# Patient Record
Sex: MALE | Race: WHITE | NOT HISPANIC OR LATINO | Employment: FULL TIME | URBAN - METROPOLITAN AREA
[De-identification: names, ages, dates, MRNs, and addresses within clinical notes are randomized per-mention and may not be internally consistent; named-entity substitution may affect disease eponyms.]

---

## 2017-04-27 ENCOUNTER — TELEPHONE (OUTPATIENT)
Dept: URGENT CARE | Facility: CLINIC | Age: 52
End: 2017-04-27

## 2017-04-27 DIAGNOSIS — B96.89 SINUSITIS, BACTERIAL: Primary | ICD-10-CM

## 2017-04-27 DIAGNOSIS — J32.9 SINUSITIS, BACTERIAL: Primary | ICD-10-CM

## 2017-04-27 RX ORDER — DOXYCYCLINE HYCLATE 100 MG/1
CAPSULE ORAL
Qty: 20 CAPSULE | Refills: 0 | Status: SHIPPED | OUTPATIENT
Start: 2017-04-27 | End: 2022-04-15

## 2017-04-27 NOTE — TELEPHONE ENCOUNTER
D/w pt.  Sinus sx better but never resolved now worsening; augmentin LD am 4/24.  P - doxy #20; now sched pcp.

## 2017-12-29 ENCOUNTER — LAB REQUISITION (OUTPATIENT)
Dept: LAB | Facility: OTHER | Age: 52
End: 2017-12-29

## 2017-12-29 DIAGNOSIS — Z00.00 ANNUAL PHYSICAL EXAM: ICD-10-CM

## 2017-12-29 LAB
ALBUMIN SERPL-MCNC: 3.8 G/DL (ref 3.5–5.2)
ALP SERPL-CCNC: 106 U/L (ref 39–117)
ALT SERPL W P-5'-P-CCNC: 24 U/L (ref 1–41)
AST SERPL-CCNC: 22 U/L (ref 1–40)
BASOPHILS # BLD AUTO: 0.01 10*3/MM3 (ref 0–0.2)
BASOPHILS NFR BLD AUTO: 0.1 % (ref 0–1.5)
BILIRUB CONJ SERPL-MCNC: <0.2 MG/DL (ref 0–0.3)
BILIRUB SERPL-MCNC: 0.5 MG/DL (ref 0.1–1.2)
BILIRUB UR QL STRIP: NEGATIVE
BUN BLD-MCNC: 14 MG/DL (ref 6–20)
CALCIUM SPEC-SCNC: 9 MG/DL (ref 8.6–10.5)
CHLORIDE SERPL-SCNC: 102 MMOL/L (ref 98–107)
CHOLEST SERPL-MCNC: 223 MG/DL (ref 0–200)
CHOLEST SERPL-MCNC: 224 MG/DL (ref 0–200)
CLARITY UR: CLEAR
CO2 SERPL-SCNC: 26.6 MMOL/L (ref 22–29)
COLOR UR: YELLOW
CREAT BLD-MCNC: 1.17 MG/DL (ref 0.76–1.27)
DEPRECATED RDW RBC AUTO: 47.7 FL (ref 37–54)
EOSINOPHIL # BLD AUTO: 0.1 10*3/MM3 (ref 0–0.7)
EOSINOPHIL NFR BLD AUTO: 1.4 % (ref 0.3–6.2)
ERYTHROCYTE [DISTWIDTH] IN BLOOD BY AUTOMATED COUNT: 13.3 % (ref 11.5–14.5)
GFR SERPL CREATININE-BSD FRML MDRD: 65 ML/MIN/1.73
GGT SERPL-CCNC: 23 U/L (ref 8–61)
GLUCOSE BLD-MCNC: 92 MG/DL (ref 65–99)
GLUCOSE UR STRIP-MCNC: NEGATIVE MG/DL
HCT VFR BLD AUTO: 53.6 % (ref 40.4–52.2)
HDLC SERPL-MCNC: 51 MG/DL (ref 40–60)
HDLC SERPL-MCNC: 51 MG/DL (ref 40–60)
HGB BLD-MCNC: 17.5 G/DL (ref 13.7–17.6)
HGB UR QL STRIP.AUTO: NEGATIVE
IMM GRANULOCYTES # BLD: 0 10*3/MM3 (ref 0–0.03)
IMM GRANULOCYTES NFR BLD: 0 % (ref 0–0.5)
IRON 24H UR-MRATE: 72 MCG/DL (ref 59–158)
KETONES UR QL STRIP: NEGATIVE
LDH SERPL-CCNC: 257 U/L (ref 135–225)
LDLC SERPL CALC-MCNC: 152 MG/DL (ref 0–100)
LDLC SERPL CALC-MCNC: 153 MG/DL (ref 0–100)
LDLC/HDLC SERPL: 2.98 {RATIO}
LDLC/HDLC SERPL: 3 {RATIO}
LEUKOCYTE ESTERASE UR QL STRIP.AUTO: NEGATIVE
LYMPHOCYTES # BLD AUTO: 0.74 10*3/MM3 (ref 0.9–4.8)
LYMPHOCYTES NFR BLD AUTO: 10.6 % (ref 19.6–45.3)
MCH RBC QN AUTO: 31.6 PG (ref 27–32.7)
MCHC RBC AUTO-ENTMCNC: 32.6 G/DL (ref 32.6–36.4)
MCV RBC AUTO: 96.8 FL (ref 79.8–96.2)
MONOCYTES # BLD AUTO: 0.76 10*3/MM3 (ref 0.2–1.2)
MONOCYTES NFR BLD AUTO: 10.9 % (ref 5–12)
NEUTROPHILS # BLD AUTO: 5.34 10*3/MM3 (ref 1.9–8.1)
NEUTROPHILS NFR BLD AUTO: 77 % (ref 42.7–76)
NITRITE UR QL STRIP: NEGATIVE
PH UR STRIP.AUTO: 6 [PH] (ref 5–8)
PHOSPHATE SERPL-MCNC: 3.1 MG/DL (ref 2.5–4.5)
PLATELET # BLD AUTO: 143 10*3/MM3 (ref 140–500)
PMV BLD AUTO: 11.1 FL (ref 6–12)
POTASSIUM BLD-SCNC: 4.1 MMOL/L (ref 3.5–5.2)
PROT SERPL-MCNC: 7.6 G/DL (ref 6–8.5)
PROT UR QL STRIP: NEGATIVE
PSA SERPL-MCNC: 0.64 NG/ML (ref 0–4)
RBC # BLD AUTO: 5.54 10*6/MM3 (ref 4.6–6)
SODIUM BLD-SCNC: 139 MMOL/L (ref 136–145)
SP GR UR STRIP: 1.02 (ref 1–1.03)
TRIGL SERPL-MCNC: 100 MG/DL (ref 0–150)
TRIGL SERPL-MCNC: 100 MG/DL (ref 0–150)
URATE SERPL-MCNC: 5.1 MG/DL (ref 3.4–7)
UROBILINOGEN UR QL STRIP: NORMAL
VLDLC SERPL-MCNC: 20 MG/DL (ref 5–40)
VLDLC SERPL-MCNC: 20 MG/DL (ref 5–40)
WBC NRBC COR # BLD: 6.95 10*3/MM3 (ref 4.5–10.7)

## 2017-12-29 PROCEDURE — 80053 COMPREHEN METABOLIC PANEL: CPT | Performed by: PHYSICAL MEDICINE & REHABILITATION

## 2017-12-29 PROCEDURE — 80061 LIPID PANEL: CPT | Performed by: PHYSICAL MEDICINE & REHABILITATION

## 2017-12-29 PROCEDURE — 85025 COMPLETE CBC W/AUTO DIFF WBC: CPT | Performed by: PHYSICAL MEDICINE & REHABILITATION

## 2017-12-29 PROCEDURE — 81003 URINALYSIS AUTO W/O SCOPE: CPT | Performed by: PHYSICAL MEDICINE & REHABILITATION

## 2017-12-29 PROCEDURE — 84153 ASSAY OF PSA TOTAL: CPT | Performed by: PHYSICAL MEDICINE & REHABILITATION

## 2022-04-15 ENCOUNTER — OFFICE VISIT (OUTPATIENT)
Dept: INTERNAL MEDICINE | Facility: CLINIC | Age: 57
End: 2022-04-15

## 2022-04-15 VITALS
BODY MASS INDEX: 30.6 KG/M2 | SYSTOLIC BLOOD PRESSURE: 142 MMHG | DIASTOLIC BLOOD PRESSURE: 86 MMHG | OXYGEN SATURATION: 98 % | HEART RATE: 80 BPM | WEIGHT: 206.6 LBS | HEIGHT: 69 IN | TEMPERATURE: 97.3 F

## 2022-04-15 DIAGNOSIS — M48.061 SPINAL STENOSIS OF LUMBAR REGION, UNSPECIFIED WHETHER NEUROGENIC CLAUDICATION PRESENT: ICD-10-CM

## 2022-04-15 DIAGNOSIS — Z12.5 SCREENING FOR MALIGNANT NEOPLASM OF PROSTATE: ICD-10-CM

## 2022-04-15 DIAGNOSIS — M48.02 CERVICAL STENOSIS OF SPINAL CANAL: ICD-10-CM

## 2022-04-15 DIAGNOSIS — K76.89 HEPATIC CYST: ICD-10-CM

## 2022-04-15 DIAGNOSIS — F33.0 MAJOR DEPRESSIVE DISORDER, RECURRENT EPISODE, MILD DEGREE: ICD-10-CM

## 2022-04-15 DIAGNOSIS — N28.1 RENAL CYST: ICD-10-CM

## 2022-04-15 DIAGNOSIS — Z12.12 ENCOUNTER FOR SCREENING FOR COLORECTAL MALIGNANT NEOPLASM: ICD-10-CM

## 2022-04-15 DIAGNOSIS — Z12.11 ENCOUNTER FOR SCREENING FOR COLORECTAL MALIGNANT NEOPLASM: ICD-10-CM

## 2022-04-15 DIAGNOSIS — K21.9 GASTROESOPHAGEAL REFLUX DISEASE WITHOUT ESOPHAGITIS: ICD-10-CM

## 2022-04-15 DIAGNOSIS — Z13.1 SCREENING FOR DIABETES MELLITUS: ICD-10-CM

## 2022-04-15 DIAGNOSIS — M54.12 CERVICAL RADICULOPATHY: ICD-10-CM

## 2022-04-15 DIAGNOSIS — M54.42 CHRONIC BILATERAL LOW BACK PAIN WITH BILATERAL SCIATICA: ICD-10-CM

## 2022-04-15 DIAGNOSIS — Z13.220 SCREENING, LIPID: ICD-10-CM

## 2022-04-15 DIAGNOSIS — M54.41 CHRONIC BILATERAL LOW BACK PAIN WITH BILATERAL SCIATICA: ICD-10-CM

## 2022-04-15 DIAGNOSIS — G89.29 CHRONIC BILATERAL LOW BACK PAIN WITH BILATERAL SCIATICA: ICD-10-CM

## 2022-04-15 DIAGNOSIS — R03.0 ELEVATED BLOOD PRESSURE READING WITHOUT DIAGNOSIS OF HYPERTENSION: Primary | ICD-10-CM

## 2022-04-15 DIAGNOSIS — I20.1 PRINZMETAL'S ANGINA: ICD-10-CM

## 2022-04-15 PROBLEM — R13.19 ESOPHAGEAL DYSPHAGIA: Status: ACTIVE | Noted: 2018-10-24

## 2022-04-15 PROBLEM — R13.19 ESOPHAGEAL DYSPHAGIA: Status: RESOLVED | Noted: 2018-10-24 | Resolved: 2022-04-15

## 2022-04-15 PROBLEM — M54.16 LUMBAR RADICULOPATHY: Status: ACTIVE | Noted: 2020-11-16

## 2022-04-15 PROBLEM — S43.432S LABRAL TEAR OF SHOULDER, LEFT, SEQUELA: Status: ACTIVE | Noted: 2022-04-15

## 2022-04-15 PROCEDURE — 99214 OFFICE O/P EST MOD 30 MIN: CPT | Performed by: FAMILY MEDICINE

## 2022-04-15 RX ORDER — CHOLECALCIFEROL (VITAMIN D3) 50 MCG
2000 TABLET ORAL DAILY
COMMUNITY

## 2022-04-15 RX ORDER — MULTIPLE VITAMINS W/ MINERALS TAB 9MG-400MCG
1 TAB ORAL DAILY
COMMUNITY

## 2022-04-15 RX ORDER — ASCORBIC ACID 1000 MG
TABLET, EXTENDED RELEASE ORAL
COMMUNITY
End: 2022-04-15

## 2022-04-15 RX ORDER — VIT C/B6/B5/MAGNESIUM/HERB 173 50-5-6-5MG
1000 CAPSULE ORAL
COMMUNITY
End: 2022-11-17

## 2022-04-15 RX ORDER — ASCORBIC ACID 250 MG
1000 TABLET ORAL DAILY
COMMUNITY
End: 2022-04-15

## 2022-04-15 RX ORDER — DIPHENOXYLATE HYDROCHLORIDE AND ATROPINE SULFATE 2.5; .025 MG/1; MG/1
TABLET ORAL DAILY
COMMUNITY
End: 2022-04-15

## 2022-04-15 RX ORDER — IBUPROFEN 600 MG/1
600 TABLET ORAL
COMMUNITY
End: 2022-04-15

## 2022-04-15 NOTE — PROGRESS NOTES
Date of Encounter: 04/15/2022  Patient: Manolo Stearns,  1965    Subjective   History of Presenting Illness  Chief complaint: Back pain    Chronic lower back pain, chronic neck pain, with acquired and congenital cervical and lumbar stenosis, with radicular symptoms of the left upper extremity and bilateral lower extremities.  He did have several neck injuries playing football when younger but most of his symptoms started about 2 years ago after a fall in the shower, he also felt that his back pain worsened acutely after the COVID-19 vaccination.  In the past he has had a myelogram which was inconclusive, he did consult with several neurosurgeons who did not recommend surgical intervention.  He is hesitant to take medications but occasionally uses NSAIDs and acetaminophen sparingly as needed.  The most benefit he has had was from physical therapy, right now he would like to continue that before any further recommendations.  He is interested in rheumatoid arthritis testing today.  His grandmother had rheumatoid arthritis.    MRI of his thoracic spine 2021, there is incidental renal and hepatic lesion thought to be a cyst recommended further characterization with ultrasound.    Prinzmetal's angina 2018, briefly he underwent cardiac catheterization that was normal, CT PE was negative, Doppler was negative for DVT, he did undergo an EGD 2018 showing mild erosive esophagitis that they did not think corresponded to his symptoms.  He did have an episode after this initial presentation that did improve with nitroglycerin.  No recent recurrence.    GERD, tries to avoid dietary triggers.  He has to take Tums a few times a month for this.    Major depressive disorder with history of suicidal ideation, not currently interested medications due to concern for risk of suicidal ideation.  Has seen a therapist which is helpful.    Lives with wife, 2 children.  Never a smoker.  2 alcoholic drinks per week.   Exercises regularly currently doing physical therapy.  Healthy diet.  Has never had colon cancer screening.  Works as an  with the [employer].  Does not have a living will.  Received COVID #1 and 2, declines further vaccinations due to worsening arthritis with these first 2 doses..    Review of Systems:  Negative for fever, cough, shortness of breath    The following portions of the patient's history were reviewed and updated as appropriate: allergies, current medications, past family history, past medical history, past social history, past surgical history and problem list.    Patient Active Problem List   Diagnosis   • Lumbar radiculopathy   • Prinzmetal's angina (HCC)   • Labral tear of shoulder, left, sequela   • Lumbar stenosis, congenital and acquired   • Chronic bilateral low back pain with bilateral sciatica   • Hepatic cyst   • Renal cyst   • Elevated blood pressure reading without diagnosis of hypertension   • Cervical stenosis of spinal canal   • Cervical radiculopathy   • Major depressive disorder, recurrent episode, mild degree (HCC)     History reviewed. No pertinent past medical history.  History reviewed. No pertinent surgical history.  History reviewed. No pertinent family history.    Current Outpatient Medications:   •  Cholecalciferol (Vitamin D) 50 MCG (2000 UT) tablet, Take 2,000 Units by mouth Daily., Disp: , Rfl:   •  multivitamin with minerals tablet tablet, Take 1 tablet by mouth Daily., Disp: , Rfl:   •  Turmeric 500 MG capsule, Take 1,000 mg by mouth., Disp: , Rfl:   •  Vitamin D-Vitamin K (VITAMIN K2-VITAMIN D3 PO), Take  by mouth., Disp: , Rfl:   No Known Allergies  Social History     Tobacco Use   • Smoking status: Never Smoker   • Smokeless tobacco: Never Used   Substance Use Topics   • Alcohol use: Yes     Comment: OCC          Objective   Physical Exam  Vitals:    04/15/22 1003   BP: 142/86   Pulse: 80   Temp: 97.3 °F (36.3 °C)   TempSrc: Temporal   SpO2: 98%  "  Weight: 93.7 kg (206 lb 9.6 oz)   Height: 175.3 cm (69\")     Body mass index is 30.51 kg/m².    Constitutional: NAD.  Eyes: EOMI. PERRLA. Normal conjunctiva.  Cardiovascular: RRR. No murmurs. No LE edema b/l. Radial pulses 2+ bilaterally.  Pulmonary: CTA b/l. Good effort.  Integumentary: No rashes or wounds on face or upper extremities.  Lymphatic: No anterior cervical lymphadenopathy.  Endocrine: No thyromegaly or palpable thyroid nodules.  Psychiatric: Normal affect. Normal thought content.     Assessment/Plan   Assessment and Plan  Pleasant 56-year-old male with cervical and lumbar stenosis with chronic pain and radiculopathy, GERD, Prinzmetal's angina, major depressive disorder with history of SI, who presents with the following:    Diagnoses and all orders for this visit:    1. Elevated blood pressure reading without diagnosis of hypertension (Primary): Recommend he check his blood pressure at home, usually not this elevated, he will call me if it remains elevated but we will also follow-up annual physical in 6 months.  -     CBC & Differential  -     Comprehensive Metabolic Panel  -     Thyroid Panel With TSH    2. Chronic bilateral low back pain with bilateral sciatica  -     C-reactive Protein  -     Sedimentation Rate  -     Cyclic Citrul Peptide Antibody, IgG / IgA  -     Rheumatoid Factor  -     Uric Acid  3. Spinal stenosis of lumbar region, unspecified whether neurogenic claudication present  4. Cervical stenosis of spinal canal  5. Cervical radiculopathy    We have additional options including SNRIs, gabapentin, SARAH, for now continue physical therapy.  RA testing as above.    6. Renal cyst  -     US Abdomen Complete; Future  -     Urinalysis With Microscopic - Urine, Clean Catch  7. Hepatic cyst  -     US Abdomen Complete; Future    8. Prinzmetal's angina (HCC): No recent flares, improves with nitroglycerin    9. Major depressive disorder, recurrent episode, mild degree (HCC): Stable, discussed the " potential utility of SSRIs, will continue therapy for now    10. Gastroesophageal reflux disease without esophagitis: Recommend preventative over-the-counter famotidine before triggering meals    11. Encounter for screening for colorectal malignant neoplasm  -     Ambulatory Referral For Screening Colonoscopy    12. Screening for malignant neoplasm of prostate  -     PSA Screen    13. Screening for diabetes mellitus  -     Hemoglobin A1c    14. Screening, lipid  -     Lipid Panel    Follow-up in 6 months for annual physical, blood pressure recheck    Zac Henderson MD  [facility]  O: [phone]  C: [phone]    Disclaimer: Parts of this note were dictated by speech recognition. Minor errors in transcription may be present. Please call if questions.

## 2022-04-18 DIAGNOSIS — R71.8 ELEVATED HEMATOCRIT: Primary | ICD-10-CM

## 2022-04-18 PROBLEM — E78.5 HYPERLIPIDEMIA: Status: ACTIVE | Noted: 2022-04-18

## 2022-04-18 LAB
ALBUMIN SERPL-MCNC: 4.6 G/DL (ref 3.8–4.9)
ALBUMIN/GLOB SERPL: 1.5 {RATIO} (ref 1.2–2.2)
ALP SERPL-CCNC: 107 IU/L (ref 44–121)
ALT SERPL-CCNC: 22 IU/L (ref 0–44)
APPEARANCE UR: ABNORMAL
AST SERPL-CCNC: 20 IU/L (ref 0–40)
BACTERIA #/AREA URNS HPF: NORMAL /[HPF]
BASOPHILS # BLD AUTO: 0 X10E3/UL (ref 0–0.2)
BASOPHILS NFR BLD AUTO: 1 %
BILIRUB SERPL-MCNC: 0.5 MG/DL (ref 0–1.2)
BILIRUB UR QL STRIP: NEGATIVE
BUN SERPL-MCNC: 21 MG/DL (ref 6–24)
BUN/CREAT SERPL: 20 (ref 9–20)
CALCIUM SERPL-MCNC: 10 MG/DL (ref 8.7–10.2)
CASTS URNS QL MICRO: NORMAL /LPF
CCP IGA+IGG SERPL IA-ACNC: 54 UNITS (ref 0–19)
CHLORIDE SERPL-SCNC: 101 MMOL/L (ref 96–106)
CHOLEST SERPL-MCNC: 225 MG/DL (ref 100–199)
CO2 SERPL-SCNC: 24 MMOL/L (ref 20–29)
COLOR UR: YELLOW
CREAT SERPL-MCNC: 1.07 MG/DL (ref 0.76–1.27)
CRP SERPL-MCNC: <1 MG/L (ref 0–10)
EGFRCR SERPLBLD CKD-EPI 2021: 81 ML/MIN/1.73
EOSINOPHIL # BLD AUTO: 0.1 X10E3/UL (ref 0–0.4)
EOSINOPHIL NFR BLD AUTO: 2 %
EPI CELLS #/AREA URNS HPF: NORMAL /HPF (ref 0–10)
ERYTHROCYTE [DISTWIDTH] IN BLOOD BY AUTOMATED COUNT: 12.4 % (ref 11.6–15.4)
ERYTHROCYTE [SEDIMENTATION RATE] IN BLOOD BY WESTERGREN METHOD: 3 MM/HR (ref 0–30)
FT4I SERPL CALC-MCNC: 3 (ref 1.2–4.9)
GLOBULIN SER CALC-MCNC: 3 G/DL (ref 1.5–4.5)
GLUCOSE SERPL-MCNC: 92 MG/DL (ref 65–99)
GLUCOSE UR QL STRIP: NEGATIVE
HBA1C MFR BLD: 5.4 % (ref 4.8–5.6)
HCT VFR BLD AUTO: 53.8 % (ref 37.5–51)
HDLC SERPL-MCNC: 48 MG/DL
HGB BLD-MCNC: 18.3 G/DL (ref 13–17.7)
HGB UR QL STRIP: NEGATIVE
IMM GRANULOCYTES # BLD AUTO: 0 X10E3/UL (ref 0–0.1)
IMM GRANULOCYTES NFR BLD AUTO: 0 %
KETONES UR QL STRIP: NEGATIVE
LDLC SERPL CALC-MCNC: 152 MG/DL (ref 0–99)
LEUKOCYTE ESTERASE UR QL STRIP: NEGATIVE
LYMPHOCYTES # BLD AUTO: 1.5 X10E3/UL (ref 0.7–3.1)
LYMPHOCYTES NFR BLD AUTO: 18 %
MCH RBC QN AUTO: 31.2 PG (ref 26.6–33)
MCHC RBC AUTO-ENTMCNC: 34 G/DL (ref 31.5–35.7)
MCV RBC AUTO: 92 FL (ref 79–97)
MICRO URNS: ABNORMAL
MONOCYTES # BLD AUTO: 0.8 X10E3/UL (ref 0.1–0.9)
MONOCYTES NFR BLD AUTO: 9 %
MUCOUS THREADS URNS QL MICRO: PRESENT
NEUTROPHILS # BLD AUTO: 5.7 X10E3/UL (ref 1.4–7)
NEUTROPHILS NFR BLD AUTO: 70 %
NITRITE UR QL STRIP: NEGATIVE
PH UR STRIP: 7 [PH] (ref 5–7.5)
PLATELET # BLD AUTO: 183 X10E3/UL (ref 150–450)
POTASSIUM SERPL-SCNC: 4.6 MMOL/L (ref 3.5–5.2)
PROT SERPL-MCNC: 7.6 G/DL (ref 6–8.5)
PROT UR QL STRIP: ABNORMAL
PSA SERPL-MCNC: 0.5 NG/ML (ref 0–4)
RBC # BLD AUTO: 5.87 X10E6/UL (ref 4.14–5.8)
RBC #/AREA URNS HPF: NORMAL /HPF (ref 0–2)
RHEUMATOID FACT SERPL-ACNC: <10 IU/ML
SODIUM SERPL-SCNC: 142 MMOL/L (ref 134–144)
SP GR UR STRIP: 1.02 (ref 1–1.03)
T3RU NFR SERPL: 29 % (ref 24–39)
T4 SERPL-MCNC: 10.3 UG/DL (ref 4.5–12)
TRIGL SERPL-MCNC: 136 MG/DL (ref 0–149)
TSH SERPL DL<=0.005 MIU/L-ACNC: 1.95 UIU/ML (ref 0.45–4.5)
URATE SERPL-MCNC: 5.4 MG/DL (ref 3.8–8.4)
UROBILINOGEN UR STRIP-MCNC: 0.2 MG/DL (ref 0.2–1)
VLDLC SERPL CALC-MCNC: 25 MG/DL (ref 5–40)
WBC # BLD AUTO: 8.1 X10E3/UL (ref 3.4–10.8)
WBC #/AREA URNS HPF: NORMAL /HPF (ref 0–5)

## 2022-04-28 DIAGNOSIS — M54.42 CHRONIC BILATERAL LOW BACK PAIN WITH BILATERAL SCIATICA: Primary | ICD-10-CM

## 2022-04-28 DIAGNOSIS — M54.41 CHRONIC BILATERAL LOW BACK PAIN WITH BILATERAL SCIATICA: Primary | ICD-10-CM

## 2022-04-28 DIAGNOSIS — M48.061 SPINAL STENOSIS OF LUMBAR REGION, UNSPECIFIED WHETHER NEUROGENIC CLAUDICATION PRESENT: ICD-10-CM

## 2022-04-28 DIAGNOSIS — G89.29 CHRONIC BILATERAL LOW BACK PAIN WITH BILATERAL SCIATICA: Primary | ICD-10-CM

## 2022-04-28 DIAGNOSIS — R76.8 CYCLIC CITRULLINATED PEPTIDE (CCP) ANTIBODY POSITIVE: ICD-10-CM

## 2022-04-28 DIAGNOSIS — M48.02 CERVICAL STENOSIS OF SPINAL CANAL: ICD-10-CM

## 2022-05-05 ENCOUNTER — HOSPITAL ENCOUNTER (OUTPATIENT)
Dept: ULTRASOUND IMAGING | Facility: HOSPITAL | Age: 57
Discharge: HOME | End: 2022-05-05
Admitting: FAMILY MEDICINE

## 2022-05-05 DIAGNOSIS — N28.1 RENAL CYST: ICD-10-CM

## 2022-05-05 DIAGNOSIS — K76.89 HEPATIC CYST: ICD-10-CM

## 2022-05-05 PROCEDURE — 76700 US EXAM ABDOM COMPLETE: CPT

## 2022-05-06 NOTE — PROGRESS NOTES
You do have a few small gallstones and 2 benign liver cyst and 1 benign kidney cyst.  I do not think these are of any significance and I do not recommend any further follow-up at this time.

## 2022-05-11 ENCOUNTER — OFFICE VISIT (OUTPATIENT)
Dept: INTERNAL MEDICINE | Facility: CLINIC | Age: 57
End: 2022-05-11

## 2022-05-11 VITALS
TEMPERATURE: 98.2 F | HEIGHT: 69 IN | OXYGEN SATURATION: 95 % | SYSTOLIC BLOOD PRESSURE: 126 MMHG | DIASTOLIC BLOOD PRESSURE: 74 MMHG | BODY MASS INDEX: 30.51 KG/M2 | WEIGHT: 206 LBS | HEART RATE: 83 BPM

## 2022-05-11 DIAGNOSIS — R09.81 NASAL CONGESTION: Primary | ICD-10-CM

## 2022-05-11 DIAGNOSIS — J02.9 SORE THROAT: ICD-10-CM

## 2022-05-11 DIAGNOSIS — R50.9 FEVER, UNSPECIFIED FEVER CAUSE: ICD-10-CM

## 2022-05-11 DIAGNOSIS — R05.9 COUGH: ICD-10-CM

## 2022-05-11 LAB
EXPIRATION DATE: NORMAL
EXPIRATION DATE: NORMAL
FLUAV AG UPPER RESP QL IA.RAPID: NOT DETECTED
FLUBV AG UPPER RESP QL IA.RAPID: NOT DETECTED
INTERNAL CONTROL: NORMAL
INTERNAL CONTROL: NORMAL
Lab: NORMAL
Lab: NORMAL
S PYO AG THROAT QL: NEGATIVE
SARS-COV-2 AG UPPER RESP QL IA.RAPID: NOT DETECTED

## 2022-05-11 PROCEDURE — 87428 SARSCOV & INF VIR A&B AG IA: CPT | Performed by: NURSE PRACTITIONER

## 2022-05-11 PROCEDURE — 99214 OFFICE O/P EST MOD 30 MIN: CPT | Performed by: NURSE PRACTITIONER

## 2022-05-11 PROCEDURE — 87880 STREP A ASSAY W/OPTIC: CPT | Performed by: NURSE PRACTITIONER

## 2022-05-11 RX ORDER — AZITHROMYCIN 250 MG/1
TABLET, FILM COATED ORAL
Qty: 6 TABLET | Refills: 0 | Status: SHIPPED | OUTPATIENT
Start: 2022-05-11 | End: 2022-10-17

## 2022-05-11 RX ORDER — BENZONATATE 100 MG/1
100 CAPSULE ORAL 3 TIMES DAILY PRN
Qty: 30 CAPSULE | Refills: 0 | Status: SHIPPED | OUTPATIENT
Start: 2022-05-11 | End: 2022-10-17

## 2022-05-11 NOTE — PROGRESS NOTES
Date of Encounter: 2022  Patient: Manolo Stearns,  1965    Subjective     Chief complaint: Nasal congestion, cough, fever, sore throat    HPI   Patient here today for sick visit.  Patient states that he has been feeling bad for over a week.  Patient has lots of drainage as well as tenderness across his forehead and under his left eye.  Patient states he took ibuprofen for a fever this morning.  Has also been taking some allergy medication over-the-counter from [employer].   Patient states he has a dry cough that is deep in his chest.  Patient denies being around anybody that he knows to be sick.  Patient states he is just felt worse over the last week.     Patient states he does not typically like to take medications but feels like he is getting worse and not better.    Review of Systems:  Negative forchest pain upon exertion, shortness of breath, vision changes, vomiting, dysuria, lymphadenopathy, muscle weakness, numbness, mood changes, rashes.  Positive for fever, cough, congestion.    The following portions of the patient's history were reviewed and updated as appropriate: allergies, current medications, past family history, past medical history, past social history, past surgical history and problem list.    Patient Active Problem List   Diagnosis   • Lumbar radiculopathy   • Prinzmetal's angina (HCC)   • Labral tear of shoulder, left, sequela   • Lumbar stenosis, congenital and acquired   • Chronic bilateral low back pain with bilateral sciatica   • Hepatic cyst   • Renal cyst   • Elevated blood pressure reading without diagnosis of hypertension   • Cervical stenosis of spinal canal   • Cervical radiculopathy   • Major depressive disorder, recurrent episode, mild degree (HCC)   • Hyperlipidemia   • Elevated hematocrit     No past medical history on file.  No past surgical history on file.  No family history on file.    Current Outpatient Medications:   •  Cholecalciferol (Vitamin D) 50 MCG (  "UT) tablet, Take 2,000 Units by mouth Daily., Disp: , Rfl:   •  multivitamin with minerals tablet tablet, Take 1 tablet by mouth Daily., Disp: , Rfl:   •  Turmeric 500 MG capsule, Take 1,000 mg by mouth., Disp: , Rfl:   •  Vitamin D-Vitamin K (VITAMIN K2-VITAMIN D3 PO), Take  by mouth., Disp: , Rfl:   •  azithromycin (Zithromax Z-Ashok) 250 MG tablet, Take 2 tablets by mouth on day 1, then 1 tablet daily on days 2-5, Disp: 6 tablet, Rfl: 0  •  benzonatate (Tessalon Perles) 100 MG capsule, Take 1 capsule by mouth 3 (Three) Times a Day As Needed for Cough., Disp: 30 capsule, Rfl: 0  No Known Allergies  Social History     Tobacco Use   • Smoking status: Never Smoker   • Smokeless tobacco: Never Used   Substance Use Topics   • Alcohol use: Yes     Comment: OCC          Objective   Physical Exam   Vitals:    05/11/22 0906   BP: 126/74   Pulse: 83   Temp: 98.2 °F (36.8 °C)   TempSrc: Temporal   SpO2: 95%   Weight: 93.4 kg (206 lb)   Height: 175.3 cm (69\")     Body mass index is 30.42 kg/m².    Constitutional: NAD.  Eyes: EOMI. PERRLA. Normal conjunctiva.  Ear, nose, mouth, throat: No tonsillar exudates positive for erythema.   Normal nasal mucosa. Normal external ear canals and right TM red.  Tenderness with palpation under left eye.  Cardiovascular: RRR. No murmurs. No LE edema b/l. Radial pulses 2+ bilaterally.  Pulmonary: CTA b/l. Good effort.           Assessment & Plan   Assessment and Plan  Pleasant 56-year-old male with elevated hematocrit, major depressive order, lumbar radiculopathy and others here today for sick visit.  The following was discussed:    Diagnoses and all orders for this visit:    1. Nasal congestion (Primary)  -     POCT SARS-CoV-2 Antigen KALYN  -     azithromycin (Zithromax Z-Ashok) 250 MG tablet; Take 2 tablets by mouth on day 1, then 1 tablet daily on days 2-5  Dispense: 6 tablet; Refill: 0    2. Cough  -     POCT SARS-CoV-2 Antigen KALYN  -     azithromycin (Zithromax Z-Ashok) 250 MG tablet; Take 2 " tablets by mouth on day 1, then 1 tablet daily on days 2-5  Dispense: 6 tablet; Refill: 0  -     benzonatate (Tessalon Perles) 100 MG capsule; Take 1 capsule by mouth 3 (Three) Times a Day As Needed for Cough.  Dispense: 30 capsule; Refill: 0    3. Fever, unspecified fever cause  -     POCT SARS-CoV-2 Antigen KALYN    4. Sore throat  -     POCT rapid strep A         Discussed about medications with patient.  Encourage patient to start daily allergy medication.  Discussed about staying well-hydrated and getting rest.  Answered some questions from the patient.  Patient verbalized understanding of and agreed to plan of care.  Return if symptoms worsen or fail to improve.     Asya Teixeira DNP, FNP-C  [facility]  O: [phone]      Please note that portions of this note were completed with a voice recognition program. Please call if questions.

## 2022-07-29 ENCOUNTER — HOSPITAL ENCOUNTER (OUTPATIENT)
Dept: GENERAL RADIOLOGY | Facility: HOSPITAL | Age: 57
Discharge: HOME | End: 2022-07-29
Admitting: FAMILY MEDICINE

## 2022-07-29 ENCOUNTER — OFFICE VISIT (OUTPATIENT)
Dept: INTERNAL MEDICINE | Facility: CLINIC | Age: 57
End: 2022-07-29

## 2022-07-29 VITALS
TEMPERATURE: 97.5 F | BODY MASS INDEX: 30.72 KG/M2 | SYSTOLIC BLOOD PRESSURE: 134 MMHG | HEIGHT: 69 IN | WEIGHT: 207.4 LBS | HEART RATE: 71 BPM | DIASTOLIC BLOOD PRESSURE: 94 MMHG | OXYGEN SATURATION: 98 %

## 2022-07-29 DIAGNOSIS — F33.0 MAJOR DEPRESSIVE DISORDER, RECURRENT EPISODE, MILD DEGREE: ICD-10-CM

## 2022-07-29 DIAGNOSIS — I20.1 PRINZMETAL'S ANGINA: ICD-10-CM

## 2022-07-29 DIAGNOSIS — R07.89 CHEST DISCOMFORT: ICD-10-CM

## 2022-07-29 DIAGNOSIS — R06.02 SHORTNESS OF BREATH: Primary | ICD-10-CM

## 2022-07-29 DIAGNOSIS — G89.29 CHRONIC BILATERAL LOW BACK PAIN WITH BILATERAL SCIATICA: ICD-10-CM

## 2022-07-29 DIAGNOSIS — M54.42 CHRONIC BILATERAL LOW BACK PAIN WITH BILATERAL SCIATICA: ICD-10-CM

## 2022-07-29 DIAGNOSIS — M54.41 CHRONIC BILATERAL LOW BACK PAIN WITH BILATERAL SCIATICA: ICD-10-CM

## 2022-07-29 DIAGNOSIS — I10 BENIGN ESSENTIAL HYPERTENSION: ICD-10-CM

## 2022-07-29 DIAGNOSIS — R76.8 CYCLIC CITRULLINATED PEPTIDE (CCP) ANTIBODY POSITIVE: ICD-10-CM

## 2022-07-29 PROBLEM — M16.0 PRIMARY OSTEOARTHRITIS OF BOTH HIPS: Status: ACTIVE | Noted: 2022-05-17

## 2022-07-29 PROCEDURE — 93000 ELECTROCARDIOGRAM COMPLETE: CPT | Performed by: FAMILY MEDICINE

## 2022-07-29 PROCEDURE — 99214 OFFICE O/P EST MOD 30 MIN: CPT | Performed by: FAMILY MEDICINE

## 2022-07-29 PROCEDURE — 71046 X-RAY EXAM CHEST 2 VIEWS: CPT

## 2022-07-29 NOTE — PROGRESS NOTES
Date of Encounter: 2022  Patient: Manolo Stearns,  1965    Subjective   History of Presenting Illness  Chief complaint: Shortness of breath    For most 2 weeks patient has been having exertional shortness of breath, intermittent chest pressure that is nonradiating.  Exertion that causes symptoms includes going up stairs or walking short distances around the house.  He feels this is different than his previous Prinzmetal's angina.  He has not tried his nitroglycerin for this.  He denies cough, wheezing, fever, and chills, leg swelling.    Hypertension, blood pressures at home with systolics 130s-140s/80s-90s.  At this time he is not interested in taking blood pressure medications as with his recent improvement in his back pain he feels he can make lifestyle changes needed to lose weight.    A lot of external stressors right now. He has a history of suicidal thoughts.  He is very concerned about trying antidepressants or SNRIs due to increased risk of suicidal ideation.    Positive CCP, follow-up testing ended up being negative for autoimmune disorders.    Status post corticosteroid injections of the SI joints and hips, actually having some improvement in the pain of his back, optimistic in his ability to start ambulating.    Review of Systems:  Negative for fever, wheezing, cough    The following portions of the patient's history were reviewed and updated as appropriate: allergies, current medications, past family history, past medical history, past social history, past surgical history and problem list.    Patient Active Problem List   Diagnosis   • Lumbar radiculopathy   • Prinzmetal's angina (HCC)   • Labral tear of shoulder, left, sequela   • Lumbar stenosis, congenital and acquired   • Chronic bilateral low back pain with bilateral sciatica   • Hepatic cyst   • Renal cyst   • Benign essential hypertension   • Cervical stenosis of spinal canal   • Cervical radiculopathy   • Major depressive  "disorder, recurrent episode, mild degree (HCC)   • Hyperlipidemia   • Elevated hematocrit   • Primary osteoarthritis of both hips   • Cyclic citrullinated peptide (CCP) antibody positive, follow up testing negative     No past medical history on file.  No past surgical history on file.  No family history on file.    Current Outpatient Medications:   •  Cholecalciferol (Vitamin D) 50 MCG (2000 UT) tablet, Take 2,000 Units by mouth Daily., Disp: , Rfl:   •  multivitamin with minerals tablet tablet, Take 1 tablet by mouth Daily., Disp: , Rfl:   •  Naproxen Sodium (ALEVE PO), Take  by mouth., Disp: , Rfl:   •  azithromycin (Zithromax Z-Ashok) 250 MG tablet, Take 2 tablets by mouth on day 1, then 1 tablet daily on days 2-5, Disp: 6 tablet, Rfl: 0  •  benzonatate (Tessalon Perles) 100 MG capsule, Take 1 capsule by mouth 3 (Three) Times a Day As Needed for Cough., Disp: 30 capsule, Rfl: 0  •  Turmeric 500 MG capsule, Take 1,000 mg by mouth., Disp: , Rfl:   •  Vitamin D-Vitamin K (VITAMIN K2-VITAMIN D3 PO), Take  by mouth., Disp: , Rfl:   No Known Allergies  Social History     Tobacco Use   • Smoking status: Never Smoker   • Smokeless tobacco: Never Used   Substance Use Topics   • Alcohol use: Yes     Comment: OCC          Objective   Physical Exam  Vitals:    07/29/22 1347   BP: 134/94   BP Location: Left arm   Patient Position: Sitting   Cuff Size: Adult   Pulse: 71   Temp: 97.5 °F (36.4 °C)   TempSrc: Infrared   SpO2: 98%   Weight: 94.1 kg (207 lb 6.4 oz)   Height: 175.3 cm (69\")     Body mass index is 30.63 kg/m².    Constitutional: NAD.  Cardiovascular: RRR. No murmurs. No LE edema b/l. Radial pulses 2+ bilaterally.  Pulmonary: CTA b/l. Good effort.  Integumentary: No rashes or wounds on face or upper extremities.  Lymphatic: No anterior cervical lymphadenopathy.  Endocrine: No thyromegaly or palpable thyroid nodules.  Psychiatric: Mildly blunted, mildly irritable affect.  Anxieties concerning risk of medications. Good " eye contact. Normal speech pattern. Fair insight and judgment.       Assessment & Plan   Assessment and Plan  Pleasant 56-year-old male with cervical and lumbar stenosis with chronic pain and radiculopathy, GERD, Prinzmetal's angina, major depressive disorder with history of SI, who presents with the following:    Diagnoses and all orders for this visit:    1. Shortness of breath (Primary)  -     XR Chest 2 View  -     CBC & Differential  -     Peripheral Blood Smear  -     ECG 12 Lead  2. Chest discomfort  3. Prinzmetal's angina (HCC)  -     ECG 12 Lead      ECG 12 Lead    Date/Time: 7/29/2022 9:46 PM  Performed by: Zac Henderson MD  Authorized by: Zac Henderson MD   Comparison: compared with previous ECG from 9/13/2018  Similar to previous ECG  Rhythm: sinus rhythm  Rate: normal  Rate comments: Mild a  Conduction: conduction normal  ST Segments: ST segments normal  T Waves: T waves normal  QRS axis: left  Other: no other findings    Clinical impression: normal ECG        In context of his chest discomfort, shortness of breath, stress similar to previous episode, this may be Prinzmetal's angina.  He has not yet tried nitroglycerin.  We will obtain chest x-ray and follow-up labs and if unremarkable may recommend trial of nitroglycerin, he has been offered Imdur in the past.       4. Benign essential hypertension: We discussed the etiology, prognosis, and management of hypertension.  At this time he is not interested in blood pressure medications as he feels he will be able to make lifestyle changes to improve his blood pressure.    5. Chronic bilateral low back pain with bilateral sciatica: He is having initial improvement in his osteoarthritis symptoms after the corticosteroid injections, hopeful for continued benefit.  We did discuss SNRI but he is concerned due to risk of suicidal ideation (black box warning) for this medication class in context of his history.    6. Cyclic citrullinated peptide (CCP) antibody  positive, follow up testing negative: Rheumatoid arthritis evaluation was not positive for autoimmune dysfunction or disease    7. Major depressive disorder, recurrent episode, mild degree (HCC):: He is not interested in medications that now, he will continue therapy.  May need to revisit this in the near future.    Zac Henderson MD  [facility]  O: [phone]  C: [phone]    Disclaimer: Parts of this note were dictated by speech recognition. Minor errors in transcription may be present. Please call if questions.

## 2022-07-30 NOTE — PROGRESS NOTES
Left voicemail on patient's phone  Chest x-ray overall normal  Based upon his symptoms I do wonder whether he is having recurrent Prinzmetal angina  I like him to try nitroglycerin, if improving can consider Imdur  If not improving we need to consider further evaluation  Labs obviously are still pending and may change plan

## 2022-08-02 LAB
BASOPHILS # BLD AUTO: 0 X10E3/UL (ref 0–0.2)
BASOPHILS NFR BLD AUTO: 0 %
EOSINOPHIL # BLD AUTO: 0.1 X10E3/UL (ref 0–0.4)
EOSINOPHIL NFR BLD AUTO: 2 %
ERYTHROCYTE [DISTWIDTH] IN BLOOD BY AUTOMATED COUNT: 13.3 % (ref 11.6–15.4)
HCT VFR BLD AUTO: 50.5 % (ref 37.5–51)
HGB BLD-MCNC: 16.9 G/DL (ref 13–17.7)
IMM GRANULOCYTES # BLD AUTO: 0 X10E3/UL (ref 0–0.1)
IMM GRANULOCYTES NFR BLD AUTO: 0 %
LYMPHOCYTES # BLD AUTO: 1.9 X10E3/UL (ref 0.7–3.1)
LYMPHOCYTES NFR BLD AUTO: 30 %
MCH RBC QN AUTO: 31.2 PG (ref 26.6–33)
MCHC RBC AUTO-ENTMCNC: 33.5 G/DL (ref 31.5–35.7)
MCV RBC AUTO: 93 FL (ref 79–97)
MONOCYTES # BLD AUTO: 0.7 X10E3/UL (ref 0.1–0.9)
MONOCYTES NFR BLD AUTO: 10 %
NEUTROPHILS # BLD AUTO: 3.7 X10E3/UL (ref 1.4–7)
NEUTROPHILS NFR BLD AUTO: 58 %
PATH INTERP BLD-IMP: NORMAL
PATH REV BLD -IMP: NORMAL
PATHOLOGIST NAME: NORMAL
PLATELET # BLD AUTO: 174 X10E3/UL (ref 150–450)
RBC # BLD AUTO: 5.41 X10E6/UL (ref 4.14–5.8)
WBC # BLD AUTO: 6.4 X10E3/UL (ref 3.4–10.8)

## 2022-10-17 ENCOUNTER — OFFICE VISIT (OUTPATIENT)
Dept: INTERNAL MEDICINE | Facility: CLINIC | Age: 57
End: 2022-10-17

## 2022-10-17 VITALS
HEIGHT: 69 IN | BODY MASS INDEX: 32.23 KG/M2 | HEART RATE: 88 BPM | DIASTOLIC BLOOD PRESSURE: 82 MMHG | OXYGEN SATURATION: 96 % | WEIGHT: 217.6 LBS | TEMPERATURE: 97.2 F | SYSTOLIC BLOOD PRESSURE: 139 MMHG

## 2022-10-17 DIAGNOSIS — R06.02 SHORTNESS OF BREATH: ICD-10-CM

## 2022-10-17 DIAGNOSIS — H93.13 BILATERAL TINNITUS: ICD-10-CM

## 2022-10-17 DIAGNOSIS — I20.1 PRINZMETAL'S ANGINA: ICD-10-CM

## 2022-10-17 DIAGNOSIS — H91.93 DECREASED HEARING OF BOTH EARS: ICD-10-CM

## 2022-10-17 DIAGNOSIS — I10 BENIGN ESSENTIAL HYPERTENSION: ICD-10-CM

## 2022-10-17 DIAGNOSIS — B96.89 BACTERIAL SINUSITIS: Primary | ICD-10-CM

## 2022-10-17 DIAGNOSIS — J32.9 BACTERIAL SINUSITIS: Primary | ICD-10-CM

## 2022-10-17 DIAGNOSIS — R41.89 SUBJECTIVE MEMORY COMPLAINTS: ICD-10-CM

## 2022-10-17 DIAGNOSIS — R07.89 CHEST DISCOMFORT: ICD-10-CM

## 2022-10-17 PROCEDURE — 99214 OFFICE O/P EST MOD 30 MIN: CPT | Performed by: FAMILY MEDICINE

## 2022-10-17 RX ORDER — ALBUTEROL SULFATE 90 UG/1
2 AEROSOL, METERED RESPIRATORY (INHALATION) EVERY 4 HOURS PRN
Qty: 6.7 G | Refills: 3 | Status: SHIPPED | OUTPATIENT
Start: 2022-10-17

## 2022-10-17 RX ORDER — AMOXICILLIN AND CLAVULANATE POTASSIUM 875; 125 MG/1; MG/1
1 TABLET, FILM COATED ORAL 2 TIMES DAILY
Qty: 20 TABLET | Refills: 0 | Status: SHIPPED | OUTPATIENT
Start: 2022-10-17 | End: 2022-10-27

## 2022-10-17 RX ORDER — ASCORBIC ACID 500 MG
TABLET ORAL
COMMUNITY

## 2022-10-17 RX ORDER — GUAIFENESIN AND DEXTROMETHORPHAN HYDROBROMIDE 600; 30 MG/1; MG/1
TABLET, EXTENDED RELEASE ORAL 2 TIMES DAILY PRN
COMMUNITY

## 2022-10-17 NOTE — PROGRESS NOTES
Date of Encounter: 10/17/2022  Patient: Manolo Stearns,  1965    Subjective   History of Presenting Illness  Chief complaint: Sinusitis    2-week history of worsening nasal congestion with facial pain, purulent nasal discharge, headache and fatigue.  He is using over-the-counter antihistamines for symptomatic relief.  He feels that his symptoms are worsening and this is similar to prior sinus infections.    Nonradiating chest discomfort with shortness of breath for about 4 months.  Per previous note we recommended a trial of nitroglycerin for symptomatic relief but this did not improve his symptoms and only resulted in a headache.  Symptoms most noticeable after climbing a flight of stairs.  He does not notice any wheezing, cough, or fever.  Chest x-ray 2022 overall unremarkable.  He reports a cardiac stress test through his work at some sort of pulmonary function test done several years ago that was reportedly normal.  He does not feel that his symptoms are worsening but they are also not improving.  He does not currently see a cardiologist.    Bilateral tinnitus with hearing loss.  This is severe enough that he would wear hearing aids if indicated.  He has not had formal hearing testing.    Reports some subjective word finding difficulties.  He does not get lost while driving.  He has not lapsed in any of his responsibilities.  He manages his finances without difficulty.  He reports his mood is doing better from the last time we talked.    Review of Systems:  Per HPI    The following portions of the patient's history were reviewed and updated as appropriate: allergies, current medications, past family history, past medical history, past social history, past surgical history and problem list.    Patient Active Problem List   Diagnosis   • Lumbar radiculopathy   • Prinzmetal's angina (HCC)   • Labral tear of shoulder, left, sequela   • Lumbar stenosis, congenital and acquired   • Chronic bilateral  "low back pain with bilateral sciatica   • Hepatic cyst   • Renal cyst   • Benign essential hypertension   • Cervical stenosis of spinal canal   • Cervical radiculopathy   • Major depressive disorder, recurrent episode, mild degree (HCC)   • Hyperlipidemia   • Elevated hematocrit   • Primary osteoarthritis of both hips   • Cyclic citrullinated peptide (CCP) antibody positive, follow up testing negative   • Subjective memory complaints     History reviewed. No pertinent past medical history.  History reviewed. No pertinent surgical history.  History reviewed. No pertinent family history.    Current Outpatient Medications:   •  Cholecalciferol (Vitamin D) 50 MCG (2000 UT) tablet, Take 2,000 Units by mouth Daily., Disp: , Rfl:   •  guaifenesin-dextromethorphan (MUCINEX DM)  MG tablet sustained-release 12 hour tablet, Take  by mouth 2 (Two) Times a Day As Needed., Disp: , Rfl:   •  multivitamin with minerals tablet tablet, Take 1 tablet by mouth Daily., Disp: , Rfl:   •  Naproxen Sodium (ALEVE PO), Take  by mouth., Disp: , Rfl:   •  Vitamin D-Vitamin K (VITAMIN K2-VITAMIN D3 PO), Take  by mouth., Disp: , Rfl:   •  albuterol sulfate  (90 Base) MCG/ACT inhaler, Inhale 2 puffs Every 4 (Four) Hours As Needed for Shortness of Air., Disp: 6.7 g, Rfl: 3  •  amoxicillin-clavulanate (Augmentin) 875-125 MG per tablet, Take 1 tablet by mouth 2 (Two) Times a Day for 10 days., Disp: 20 tablet, Rfl: 0  •  ascorbic acid (VITAMIN C) 500 MG tablet, , Disp: , Rfl:   •  Turmeric 500 MG capsule, Take 1,000 mg by mouth., Disp: , Rfl:   No Known Allergies  Social History     Tobacco Use   • Smoking status: Never   • Smokeless tobacco: Never   Substance Use Topics   • Alcohol use: Yes     Comment: OCC          Objective   Physical Exam  Vitals:    10/17/22 1517   BP: 139/82   Pulse: 88   Temp: 97.2 °F (36.2 °C)   TempSrc: Temporal   SpO2: 96%   Weight: 98.7 kg (217 lb 9.6 oz)   Height: 175.3 cm (69\")     Body mass index is 32.13 " kg/m².    Constitutional: NAD.  Eyes: EOMI. PERRLA. Normal conjunctiva.  Ear, nose, mouth, throat: Postnasal drip.  No tonsillar exudates or erythema.   Boggy nasal mucosa with thick yellow rhinorrhea.  There is bilateral maxillary sinus tenderness to palpation and right frontal sinus tenderness to palpation.  Normal external ear canals and TMs bilaterally.  Cardiovascular: RRR. No murmurs. No LE edema b/l. Radial pulses 2+ bilaterally.  Pulmonary: CTA b/l. Good effort.  Integumentary: No rashes or wounds on face or upper extremities.  Lymphatic: Jugulodigastric nodes are tender to palpation.  Endocrine: No thyromegaly or palpable thyroid nodules.  Psychiatric: Normal affect. Normal thought content.     Assessment & Plan   Assessment and Plan  Pleasant 56-year-old male with cervical and lumbar stenosis with chronic pain and radiculopathy, GERD, Prinzmetal's angina, major depressive disorder with history of SI, who presents with the following:       Diagnoses and all orders for this visit:    1. Bacterial sinusitis (Primary)  -     amoxicillin-clavulanate (Augmentin) 875-125 MG per tablet; Take 1 tablet by mouth 2 (Two) Times a Day for 10 days.  Dispense: 20 tablet; Refill: 0    2. Prinzmetal's angina (HCC)  -     Ambulatory Referral to Cardiology  3. Chest discomfort  -     Ambulatory Referral to Cardiology  4. Shortness of breath  -     albuterol sulfate  (90 Base) MCG/ACT inhaler; Inhale 2 puffs Every 4 (Four) Hours As Needed for Shortness of Air.  Dispense: 6.7 g; Refill: 3  -     Ambulatory Referral to Cardiology  Exertional chest discomfort with shortness of breath.  EKG at last appointment was overall unremarkable.  EGD only showed mild esophagitis.  Chest x-ray normal.  Peak flow meter is normal range today.  Recent repeat CBC was normal.  Discomfort did not improve with nitroglycerin.  He reports cardiac stress testing and some sort of lung function test through his work but records are not  available.    Based on his history of Prinzmetal's angina would like a consultation with cardiology for further evaluation.  Meanwhile, we will try him with an empiric albuterol inhaler in case this is related to atelectasis noted on chest x-ray 7/2022.  I discussed reasons to return for further evaluation.    5. Benign essential hypertension: Continue to monitor borderline hypertension    6. Bilateral tinnitus:  -     Ambulatory Referral to Audiology  7. Decreased hearing of both ears  -     Ambulatory Referral to Audiology    8. Subjective memory complaints: Interview does not yield any obvious memory deficits.  We will do [place] at next appointment if still having problems.    Zac Henderson MD  Family Medicine  O: [phone]  C: [phone]    Disclaimer: Parts of this note were dictated by speech recognition. Minor errors in transcription may be present. Please call if questions.

## 2022-11-17 ENCOUNTER — OFFICE VISIT (OUTPATIENT)
Dept: CARDIOLOGY | Facility: CLINIC | Age: 57
End: 2022-11-17

## 2022-11-17 VITALS
HEART RATE: 86 BPM | BODY MASS INDEX: 31.84 KG/M2 | HEIGHT: 69 IN | SYSTOLIC BLOOD PRESSURE: 130 MMHG | DIASTOLIC BLOOD PRESSURE: 90 MMHG | WEIGHT: 215 LBS

## 2022-11-17 DIAGNOSIS — I10 BENIGN ESSENTIAL HYPERTENSION: ICD-10-CM

## 2022-11-17 DIAGNOSIS — R06.02 SOB (SHORTNESS OF BREATH): Primary | ICD-10-CM

## 2022-11-17 DIAGNOSIS — E78.2 MIXED HYPERLIPIDEMIA: ICD-10-CM

## 2022-11-17 DIAGNOSIS — I20.1 PRINZMETAL'S ANGINA: ICD-10-CM

## 2022-11-17 PROCEDURE — 93000 ELECTROCARDIOGRAM COMPLETE: CPT | Performed by: INTERNAL MEDICINE

## 2022-11-17 PROCEDURE — 99204 OFFICE O/P NEW MOD 45 MIN: CPT | Performed by: INTERNAL MEDICINE

## 2022-11-17 NOTE — PROGRESS NOTES
Subjective:     Encounter Date:11/17/22      Patient ID: Manolo Stearns is a 57 y.o. male.    Chief Complaint:  History of Present Illness    Dear Dr. Henderson,    I had the pleasure of seeing this patient in the office today for initial evaluation and consultation.  I appreciate that you sent him in to see us.  They come in today to be seen for dyspnea on exertion.    Patient has been followed in the past at [facility] cardiology.  He had an episode 2018 that led to a cardiac catheterization.  Cardiac catheterization showed wide open coronaries with no abnormality seen.  He was diagnosed with Prinzmetal's angina and was prescribed sublingual nitroglycerin to take on an as-needed basis.  He says it extremely rarely does he ever have to take a nitroglycerin, sometimes it will be more than a year in between times.    He comes in today not because of any chest pain or chest discomfort.  Actually just been getting more short of breath when he does activity.  Sometimes he has a little bit of wheeziness.  No cough.  No chills or fevers.  He has no associated chest discomfort, no back discomfort, no jaw or throat discomfort, and no arm or underarm discomfort.  No associated nausea, vomiting, or diaphoresis.  As noted above his cardiac catheterization 2018 showed no coronary disease and no plaque was visualized.    He had a chest x-ray in July that was unremarkable.    The following portions of the patient's history were reviewed and updated as appropriate: allergies, current medications, past family history, past medical history, past social history, past surgical history and problem list.      ECG 12 Lead    Date/Time: 11/17/2022 3:19 PM  Performed by: Tomas Browning III, MD  Authorized by: Tomas Browning III, MD   Comparison: compared with previous ECG   Similar to previous ECG  Rhythm: sinus rhythm  Rate: normal  Conduction: conduction normal  ST Segments: ST segments normal  T Waves: T waves normal  QRS axis:  "normal  Other findings: non-specific ST-T wave changes    Clinical impression: normal ECG               Objective:     Vitals:    11/17/22 1409   BP: 130/90   BP Location: Left arm   Patient Position: Sitting   Pulse: 86   Weight: 97.5 kg (215 lb)   Height: 175.3 cm (69\")     Body mass index is 31.75 kg/m².      Vitals reviewed.   Constitutional:       General: Not in acute distress.     Appearance: Well-developed. Not diaphoretic.   Eyes:      General:         Right eye: No discharge.         Left eye: No discharge.      Conjunctiva/sclera: Conjunctivae normal.      Pupils: Pupils are equal, round, and reactive to light.   HENT:      Head: Normocephalic and atraumatic.      Nose: Nose normal.   Neck:      Thyroid: No thyromegaly.      Trachea: No tracheal deviation.      Lymphadenopathy: No cervical adenopathy.   Pulmonary:      Effort: Pulmonary effort is normal. No respiratory distress.      Breath sounds: Normal breath sounds. No stridor.   Chest:      Chest wall: Not tender to palpatation.   Cardiovascular:      Normal rate. Regular rhythm.      Murmurs: There is no murmur.      . No S3 gallop. No click. No rub.   Pulses:     Intact distal pulses.   Edema:     Peripheral edema absent.   Abdominal:      General: Bowel sounds are normal. There is no distension.      Palpations: Abdomen is soft. There is no abdominal mass.      Tenderness: There is no abdominal tenderness. There is no guarding or rebound.   Musculoskeletal: Normal range of motion.         General: No tenderness or deformity.      Cervical back: Normal range of motion and neck supple. Skin:     General: Skin is warm and dry.      Findings: No erythema or rash.   Neurological:      Mental Status: Alert and oriented to person, place, and time.      Deep Tendon Reflexes: Reflexes are normal and symmetric.   Psychiatric:         Thought Content: Thought content normal.         Data and records reviewed:     Lab Results   Component Value Date    GLUCOSE " 92 04/15/2022    BUN 21 04/15/2022    CREATININE 1.07 04/15/2022    EGFRIFNONA 65 12/29/2017    BCR 20 04/15/2022    K 4.6 04/15/2022    CO2 24 04/15/2022    CALCIUM 10.0 04/15/2022    PROTENTOTREF 7.6 04/15/2022    ALBUMIN 4.6 04/15/2022    LABIL2 1.5 04/15/2022    AST 20 04/15/2022    ALT 22 04/15/2022     Lab Results   Component Value Date    CHOL 223 (H) 12/29/2017    CHOL 224 (H) 12/29/2017     Lab Results   Component Value Date    TRIG 136 04/15/2022    TRIG 73 09/13/2018    TRIG 100 12/29/2017    TRIG 100 12/29/2017     Lab Results   Component Value Date    HDL 48 04/15/2022    HDL 40 (L) 09/13/2018    HDL 51 12/29/2017    HDL 51 12/29/2017     Lab Results   Component Value Date     (H) 04/15/2022     (H) 09/13/2018     (H) 12/29/2017     (H) 12/29/2017     Lab Results   Component Value Date    VLDL 25 04/15/2022    VLDL 15 09/13/2018    VLDL 20 12/29/2017    VLDL 20 12/29/2017     Lab Results   Component Value Date    LDLHDL 2.98 12/29/2017    LDLHDL 3.00 12/29/2017     CBC    CBC 4/15/22 7/29/22 7/29/22     1505 1505   WBC 8.1 Comment 6.4   RBC 5.87 (A) Comment 5.41   Hemoglobin 18.3 (A) 16.9    Hematocrit 53.8 (A) 50.5    MCV 92 93    MCH 31.2 31.2    MCHC 34.0 33.5    RDW 12.4 13.3    Platelets 183 174    (A) Abnormal value       Comments are available for some flowsheets but are not being displayed.           No radiology results for the last 90 days.          Assessment:          Diagnosis Plan   1. SOB (shortness of breath)  Adult Transthoracic Echo Complete W/ Cont if Necessary Per Protocol             Plan:       1.  Shortness of breath with dyspnea on exertion- we will arrange for an echocardiogram performed.  Given the lack of any plaque or coronary artery disease visualized on his cath in 2018, and given the lack of any chest discomfort with his shortness of breath, he does not meet guideline recommendations for any ischemic testing at this time  2.  Maricarmen's  angina-extremely uncommon symptoms, continue his as needed nitroglycerin.    Thank you very much for allowing us to participate in the care of this pleasant patient.  Please don't hesitate to call if I can be of assistance in any way.      Current Outpatient Medications:   •  albuterol sulfate  (90 Base) MCG/ACT inhaler, Inhale 2 puffs Every 4 (Four) Hours As Needed for Shortness of Air., Disp: 6.7 g, Rfl: 3  •  ascorbic acid (VITAMIN C) 500 MG tablet, , Disp: , Rfl:   •  Cholecalciferol (Vitamin D) 50 MCG (2000 UT) tablet, Take 2,000 Units by mouth Daily., Disp: , Rfl:   •  guaifenesin-dextromethorphan (MUCINEX DM)  MG tablet sustained-release 12 hour tablet, Take  by mouth 2 (Two) Times a Day As Needed., Disp: , Rfl:   •  multivitamin with minerals tablet tablet, Take 1 tablet by mouth Daily., Disp: , Rfl:   •  Naproxen Sodium (ALEVE PO), Take  by mouth., Disp: , Rfl:   •  Turmeric 500 MG capsule, Take 1,000 mg by mouth., Disp: , Rfl:   •  Vitamin D-Vitamin K (VITAMIN K2-VITAMIN D3 PO), Take  by mouth., Disp: , Rfl:          No follow-ups on file.

## 2022-11-29 ENCOUNTER — APPOINTMENT (OUTPATIENT)
Dept: CARDIOLOGY | Facility: HOSPITAL | Age: 57
End: 2022-11-29

## 2022-11-30 ENCOUNTER — HOSPITAL ENCOUNTER (OUTPATIENT)
Dept: CARDIOLOGY | Facility: HOSPITAL | Age: 57
Discharge: HOME | End: 2022-11-30
Admitting: INTERNAL MEDICINE

## 2022-11-30 VITALS
BODY MASS INDEX: 31.84 KG/M2 | SYSTOLIC BLOOD PRESSURE: 130 MMHG | WEIGHT: 215 LBS | DIASTOLIC BLOOD PRESSURE: 90 MMHG | HEIGHT: 69 IN

## 2022-11-30 DIAGNOSIS — R06.02 SOB (SHORTNESS OF BREATH): ICD-10-CM

## 2022-11-30 LAB
AORTIC DIMENSIONLESS INDEX: 0.8 (DI)
BH CV ECHO MEAS - ACS: 2.07 CM
BH CV ECHO MEAS - AO MAX PG: 5.3 MMHG
BH CV ECHO MEAS - AO MEAN PG: 3 MMHG
BH CV ECHO MEAS - AO ROOT DIAM: 3.4 CM
BH CV ECHO MEAS - AO V2 MAX: 115 CM/SEC
BH CV ECHO MEAS - AO V2 VTI: 24.2 CM
BH CV ECHO MEAS - AVA(I,D): 2.9 CM2
BH CV ECHO MEAS - EDV(CUBED): 97.3 ML
BH CV ECHO MEAS - EDV(MOD-SP2): 107 ML
BH CV ECHO MEAS - EDV(MOD-SP4): 116 ML
BH CV ECHO MEAS - EF(MOD-BP): 63 %
BH CV ECHO MEAS - EF(MOD-SP2): 61.7 %
BH CV ECHO MEAS - EF(MOD-SP4): 63.8 %
BH CV ECHO MEAS - ESV(CUBED): 29.4 ML
BH CV ECHO MEAS - ESV(MOD-SP2): 41 ML
BH CV ECHO MEAS - ESV(MOD-SP4): 42 ML
BH CV ECHO MEAS - FS: 32.9 %
BH CV ECHO MEAS - IVS/LVPW: 1.2 CM
BH CV ECHO MEAS - IVSD: 1.2 CM
BH CV ECHO MEAS - LAT PEAK E' VEL: 9.6 CM/SEC
BH CV ECHO MEAS - LV DIASTOLIC VOL/BSA (35-75): 54.5 CM2
BH CV ECHO MEAS - LV MASS(C)D: 181.2 GRAMS
BH CV ECHO MEAS - LV MAX PG: 4.2 MMHG
BH CV ECHO MEAS - LV MEAN PG: 2 MMHG
BH CV ECHO MEAS - LV SYSTOLIC VOL/BSA (12-30): 19.7 CM2
BH CV ECHO MEAS - LV V1 MAX: 103 CM/SEC
BH CV ECHO MEAS - LV V1 VTI: 20.5 CM
BH CV ECHO MEAS - LVIDD: 4.6 CM
BH CV ECHO MEAS - LVIDS: 3.1 CM
BH CV ECHO MEAS - LVOT AREA: 3.4 CM2
BH CV ECHO MEAS - LVOT DIAM: 2.08 CM
BH CV ECHO MEAS - LVPWD: 1 CM
BH CV ECHO MEAS - MED PEAK E' VEL: 7.3 CM/SEC
BH CV ECHO MEAS - MV A MAX VEL: 58.2 CM/SEC
BH CV ECHO MEAS - MV DEC SLOPE: 211.1 CM/SEC2
BH CV ECHO MEAS - MV DEC TIME: 0.23 MSEC
BH CV ECHO MEAS - MV E MAX VEL: 58.7 CM/SEC
BH CV ECHO MEAS - MV E/A: 1.01
BH CV ECHO MEAS - MV MAX PG: 2.37 MMHG
BH CV ECHO MEAS - MV MEAN PG: 0.95 MMHG
BH CV ECHO MEAS - MV P1/2T: 97.9 MSEC
BH CV ECHO MEAS - MV V2 VTI: 18.1 CM
BH CV ECHO MEAS - MVA(P1/2T): 2.25 CM2
BH CV ECHO MEAS - MVA(VTI): 3.9 CM2
BH CV ECHO MEAS - PA V2 MAX: 91.4 CM/SEC
BH CV ECHO MEAS - QP/QS: 0.61
BH CV ECHO MEAS - RAP SYSTOLE: 3 MMHG
BH CV ECHO MEAS - RV MAX PG: 1.77 MMHG
BH CV ECHO MEAS - RV V1 MAX: 66.6 CM/SEC
BH CV ECHO MEAS - RV V1 VTI: 16.3 CM
BH CV ECHO MEAS - RVOT DIAM: 1.83 CM
BH CV ECHO MEAS - SI(MOD-SP2): 31 ML/M2
BH CV ECHO MEAS - SI(MOD-SP4): 34.7 ML/M2
BH CV ECHO MEAS - SV(LVOT): 69.9 ML
BH CV ECHO MEAS - SV(MOD-SP2): 66 ML
BH CV ECHO MEAS - SV(MOD-SP4): 74 ML
BH CV ECHO MEAS - SV(RVOT): 42.7 ML
BH CV ECHO MEASUREMENTS AVERAGE E/E' RATIO: 6.95
BH CV XLRA - RV BASE: 3.5 CM
BH CV XLRA - TDI S': 13.9 CM/SEC
LEFT ATRIUM VOLUME INDEX: 21.2 ML/M2
MAXIMAL PREDICTED HEART RATE: 163 BPM
SINUS: 3.3 CM
STJ: 2.8 CM
STRESS TARGET HR: 139 BPM

## 2022-11-30 PROCEDURE — 93306 TTE W/DOPPLER COMPLETE: CPT

## 2022-11-30 PROCEDURE — 93306 TTE W/DOPPLER COMPLETE: CPT | Performed by: INTERNAL MEDICINE

## 2022-12-01 ENCOUNTER — TELEPHONE (OUTPATIENT)
Dept: CARDIOLOGY | Facility: CLINIC | Age: 57
End: 2022-12-01

## 2022-12-01 NOTE — TELEPHONE ENCOUNTER
I spoke with Manolo Stearns and updated pt on recommendations from provider.  Pt verbalized understanding and has no further questions at this time.    Thank you,    Nora Do RN  Triage [facility] [place]  12/01/22 12:38 EST\

## 2022-12-01 NOTE — TELEPHONE ENCOUNTER
I tried to call Manolo Stearns but there was no answer.  Left a voicemail asking patient to call back.  Will continue to try to reach pt.    Thank you,    Nora Do, RN  Triage [facility] [place]  12/01/22 09:04 EST

## 2022-12-01 NOTE — TELEPHONE ENCOUNTER
I spoke with Manolo Stearns and updated pt on results from provider.  Pt verbalized understanding.    Pt is asking if he needs a follow up appt with you, or if there is further workup you would like him to complete for his SOA.  He currently has no follow up with the [facility] office.  Pt is asking if he needs to instead go see his PCP to have them work him up for his SOA instead.    Do you have any recommendations for this pt?    Thank you,    Nora Do RN  Triage [facility]  12/01/22 09:21 EST

## 2022-12-01 NOTE — TELEPHONE ENCOUNTER
----- Message from Tomas Browning III, MD sent at 12/1/2022  8:29 AM EST -----  Please call- normal results

## 2023-11-10 ENCOUNTER — OFFICE VISIT (OUTPATIENT)
Dept: INTERNAL MEDICINE | Facility: CLINIC | Age: 58
End: 2023-11-10
Payer: COMMERCIAL

## 2023-11-10 VITALS
WEIGHT: 215 LBS | TEMPERATURE: 99.5 F | HEART RATE: 80 BPM | HEIGHT: 69 IN | BODY MASS INDEX: 31.84 KG/M2 | SYSTOLIC BLOOD PRESSURE: 124 MMHG | DIASTOLIC BLOOD PRESSURE: 86 MMHG | OXYGEN SATURATION: 96 %

## 2023-11-10 DIAGNOSIS — R05.9 COUGH, UNSPECIFIED TYPE: Primary | ICD-10-CM

## 2023-11-10 DIAGNOSIS — J02.9 SORE THROAT: ICD-10-CM

## 2023-11-10 DIAGNOSIS — J02.0 STREP PHARYNGITIS: ICD-10-CM

## 2023-11-10 DIAGNOSIS — R50.9 FEVER, UNSPECIFIED FEVER CAUSE: ICD-10-CM

## 2023-11-10 DIAGNOSIS — B96.89 BACTERIAL SINUSITIS: ICD-10-CM

## 2023-11-10 DIAGNOSIS — J32.9 BACTERIAL SINUSITIS: ICD-10-CM

## 2023-11-10 LAB
EXPIRATION DATE: ABNORMAL
EXPIRATION DATE: NORMAL
FLUAV AG UPPER RESP QL IA.RAPID: NOT DETECTED
FLUBV AG UPPER RESP QL IA.RAPID: NOT DETECTED
INTERNAL CONTROL: ABNORMAL
INTERNAL CONTROL: NORMAL
Lab: ABNORMAL
Lab: NORMAL
S PYO AG THROAT QL: POSITIVE
SARS-COV-2 AG UPPER RESP QL IA.RAPID: NOT DETECTED

## 2023-11-10 PROCEDURE — 87428 SARSCOV & INF VIR A&B AG IA: CPT | Performed by: NURSE PRACTITIONER

## 2023-11-10 PROCEDURE — 99213 OFFICE O/P EST LOW 20 MIN: CPT | Performed by: NURSE PRACTITIONER

## 2023-11-10 RX ORDER — CEFDINIR 300 MG/1
300 CAPSULE ORAL 2 TIMES DAILY
Qty: 20 CAPSULE | Refills: 0 | Status: SHIPPED | OUTPATIENT
Start: 2023-11-10 | End: 2023-11-20

## 2023-11-10 RX ORDER — DOXYCYCLINE HYCLATE 100 MG/1
100 CAPSULE ORAL 2 TIMES DAILY
Qty: 14 CAPSULE | Refills: 0 | Status: SHIPPED | OUTPATIENT
Start: 2023-11-10 | End: 2023-11-10

## 2023-11-10 NOTE — PROGRESS NOTES
"Chief Complaint  Cough, Fever, Sore Throat, and Shortness of Breath (On exertion)    Subjective        Manolo Stearns presents to [facility]  History of Present Illness  Here with complaint of cough, fever, sore throat for about 1-2 weeks with shortness of breath on exertion, which is chronic.     He has a lot of sinus pain and pressure with thick, green drainage. Has been using OTC products, including All-Clear nasal spray and Mucinex.     No wheezing. He has an albuterol inhaler, but does not help. He usually gets sinusitis yearly, would take Z-lela does not help and Augmentin in October 2022 not effective.     He has Prinzmetal's angina. Adult transthoracic echo from November 17, 2022 was unremarkable. He follows Dr. Tomas Browning, cardiology.  Last office visit was November 17, 2022 for complaint of shortness of breath. He also follows pulmonology.       Objective   Vital Signs:  /86 (BP Location: Left arm, Patient Position: Sitting, Cuff Size: Adult)   Pulse 80   Temp 99.5 °F (37.5 °C) (Infrared)   Ht 175.3 cm (69.02\")   Wt 97.5 kg (215 lb)   SpO2 96%   BMI 31.74 kg/m²   Estimated body mass index is 31.74 kg/m² as calculated from the following:    Height as of this encounter: 175.3 cm (69.02\").    Weight as of this encounter: 97.5 kg (215 lb).       BMI is >= 30 and <35. (Class 1 Obesity). The following options were offered after discussion;: not discussed at today's office visit.       Physical Exam  Vitals reviewed.   Constitutional:       General: He is not in acute distress.     Appearance: Normal appearance. He is obese. He is not ill-appearing, toxic-appearing or diaphoretic.   HENT:      Right Ear: Ear canal and external ear normal. A middle ear effusion (cloudy, few bubbles seen) is present.      Left Ear: Ear canal and external ear normal. Tympanic membrane is erythematous (mild).      Nose:      Right Sinus: Maxillary sinus tenderness and frontal sinus " tenderness present.      Left Sinus: Maxillary sinus tenderness and frontal sinus tenderness present.      Mouth/Throat:      Pharynx: Oropharynx is clear. Posterior oropharyngeal erythema present.      Tonsils: No tonsillar exudate.   Cardiovascular:      Rate and Rhythm: Normal rate and regular rhythm.      Pulses: Normal pulses.      Heart sounds: Normal heart sounds.   Pulmonary:      Breath sounds: Decreased breath sounds (mild, equal all lobes) present. No wheezing, rhonchi or rales.   Lymphadenopathy:      Head:      Right side of head: Tonsillar adenopathy present.      Left side of head: Tonsillar adenopathy present.   Skin:     General: Skin is warm.   Neurological:      General: No focal deficit present.      Mental Status: He is alert and oriented to person, place, and time. Mental status is at baseline.   Psychiatric:         Mood and Affect: Mood normal.         Behavior: Behavior normal.         Thought Content: Thought content normal.         Judgment: Judgment normal.        Result Review :                   Assessment and Plan   Patient is a pleasant 58-year-old male with Prinzmetal's angina, benign essential hypertension, hyperlipidemia, obesity, major depressive disorder, primary osteoarthritis of both hips with other musculoskeletal issues here with upper respiratory symptoms.    Results for orders placed or performed in visit on 11/10/23   POC Rapid Strep A    Specimen: Swab   Result Value Ref Range    Rapid Strep A Screen Positive (A) Negative, VALID, INVALID, Not Performed    Internal Control Passed Passed    Lot Number [id number]     Expiration Date 4/6/26    POCT SARS-CoV-2 + Flu Antigen KALYN    Specimen: Swab   Result Value Ref Range    SARS Antigen Not Detected Not Detected, Presumptive Negative    Influenza A Antigen KALYN Not Detected Not Detected    Influenza B Antigen KALYN Not Detected Not Detected    Internal Control Passed Passed    Lot Number [id number]     Expiration Date 10/27/24           Diagnoses and all orders for this visit:    1. Cough, unspecified type (Primary)  -     POCT SARS-CoV-2 + Flu Antigen KALYN    2. Sore throat  -     POC Rapid Strep A  -     POCT SARS-CoV-2 + Flu Antigen KALYN    3. Fever, unspecified fever cause  -     POC Rapid Strep A  -     POCT SARS-CoV-2 + Flu Antigen KALYN    4. Bacterial sinusitis  -     Discontinue: doxycycline (VIBRAMYCIN) 100 MG capsule; Take 1 capsule by mouth 2 (Two) Times a Day for 7 days.  Dispense: 14 capsule; Refill: 0  -     cefdinir (OMNICEF) 300 MG capsule; Take 1 capsule by mouth 2 (Two) Times a Day for 10 days.  Dispense: 20 capsule; Refill: 0    5. Strep pharyngitis      Keep scheduled appointment with Dr. Henderson for 11/17/2023.         Follow Up   Return for Keep scheduled appointment with Dr. Henderson.  Patient was given instructions and counseling regarding his condition or for health maintenance advice. Please see specific information pulled into the AVS if appropriate.

## 2023-11-13 ENCOUNTER — TELEPHONE (OUTPATIENT)
Dept: PEDIATRICS | Facility: OTHER | Age: 58
End: 2023-11-13
Payer: COMMERCIAL

## 2023-11-13 ENCOUNTER — TELEPHONE (OUTPATIENT)
Dept: INTERNAL MEDICINE | Facility: CLINIC | Age: 58
End: 2023-11-13
Payer: COMMERCIAL

## 2023-11-13 NOTE — TELEPHONE ENCOUNTER
He should be taking cefdinir not doxycycline, looks like doxycycline was sent in by accident initially    Should help with eye symptoms does not need drops at this time but should use a warm rag to help clear debris from eyes

## 2023-11-13 NOTE — TELEPHONE ENCOUNTER
Caller: Manolo Steanrs    Relationship: Self    Best call back number:     What is the best time to reach you:     Who are you requesting to speak with (clinical staff, provider,  specific staff member):     Do you know the name of the person who called:     What was the call regarding: PATIENT SAYS HE WAS IN THE OFFICE ON 11/10/23 TO SEE DR QUINTEROS AND WAS PRESCRIBED TWO MEDICATIONS, HE ONLY THOUGHT THAT HE WAS GOING TO GET ONE.  HE WANTS TO BE CALLED BACK WITH INFORMATION ON IF HE IS TO BE TAKING THEM BOTH OR NOT AND NOW HE SAYS BOTH OF HIS EYES ARE PINK AND HAS DISCHARGE AND HE WANTS TO DISCUSS THIS AS WELL.      Is it okay if the provider responds through MyChart:

## 2024-01-11 ENCOUNTER — OFFICE VISIT (OUTPATIENT)
Dept: INTERNAL MEDICINE | Facility: CLINIC | Age: 59
End: 2024-01-11
Payer: COMMERCIAL

## 2024-01-11 VITALS
OXYGEN SATURATION: 98 % | HEART RATE: 80 BPM | DIASTOLIC BLOOD PRESSURE: 88 MMHG | TEMPERATURE: 97.7 F | SYSTOLIC BLOOD PRESSURE: 152 MMHG

## 2024-01-11 DIAGNOSIS — E78.2 MIXED HYPERLIPIDEMIA: ICD-10-CM

## 2024-01-11 DIAGNOSIS — J18.9 COMMUNITY ACQUIRED PNEUMONIA OF RIGHT LOWER LOBE OF LUNG: Primary | ICD-10-CM

## 2024-01-11 DIAGNOSIS — R06.02 CHRONIC SHORTNESS OF BREATH: ICD-10-CM

## 2024-01-11 DIAGNOSIS — R73.09 ELEVATED GLUCOSE: ICD-10-CM

## 2024-01-11 DIAGNOSIS — I10 BENIGN ESSENTIAL HYPERTENSION: ICD-10-CM

## 2024-01-11 PROCEDURE — 99214 OFFICE O/P EST MOD 30 MIN: CPT | Performed by: FAMILY MEDICINE

## 2024-01-11 RX ORDER — CEFDINIR 300 MG/1
300 CAPSULE ORAL 2 TIMES DAILY
Qty: 20 CAPSULE | Refills: 0 | Status: SHIPPED | OUTPATIENT
Start: 2024-01-11 | End: 2024-01-21

## 2024-01-11 RX ORDER — AZITHROMYCIN 250 MG/1
TABLET, FILM COATED ORAL
Qty: 6 TABLET | Refills: 0 | Status: SHIPPED | OUTPATIENT
Start: 2024-01-11

## 2024-01-11 NOTE — PROGRESS NOTES
Date of Encounter: 2024  Patient: Manolo Stearns,  1965    Subjective   History of Presenting Illness  Chief complaint: Cough, shortness of breath    Patient had sinusitis in November, ended up taking cefdinir and then doxycycline with improvement but then about a week after his symptoms resolved he began developing a productive cough with green sputum.  The symptoms have persisted essentially since that time.  He has continued over-the-counter medications for sinusitis, without significant improvement.  No fever or chills.  He has a history of nasal cautery but no turbinectomy.  He is not interested in allergy shots.    Shortness of breath since the COVID-19 vaccine.  He does have a history of Prinzmetal's angina.  He has tried to improve his exertion by walking longer distances but even with a 2 mile walk he is getting short of breath more than he ever used to.  He recently underwent a cardiac stress test which was low risk, pulmonary function testing which was normal, chest x-ray which was unremarkable.  He has not had blood work recently.    The following portions of the patient's history were reviewed and updated as appropriate: allergies, current medications, past family history, past medical history, past social history, past surgical history and problem list.    Patient Active Problem List   Diagnosis    Lumbar radiculopathy    Prinzmetal's angina    Labral tear of shoulder, left, sequela    Lumbar stenosis, congenital and acquired    Chronic bilateral low back pain with bilateral sciatica    Hepatic cyst    Renal cyst    Benign essential hypertension    Cervical stenosis of spinal canal    Cervical radiculopathy    Major depressive disorder, recurrent episode, mild degree    Hyperlipidemia    Elevated hematocrit    Primary osteoarthritis of both hips    Cyclic citrullinated peptide (CCP) antibody positive, follow up testing negative    Subjective memory complaints     History reviewed.  No pertinent past medical history.  No past surgical history on file.  History reviewed. No pertinent family history.    Current Outpatient Medications:     albuterol sulfate  (90 Base) MCG/ACT inhaler, Inhale 2 puffs Every 4 (Four) Hours As Needed for Shortness of Air., Disp: 6.7 g, Rfl: 3    ascorbic acid (VITAMIN C) 500 MG tablet, , Disp: , Rfl:     Cholecalciferol (Vitamin D) 50 MCG (2000 UT) tablet, Take 1 tablet by mouth Daily., Disp: , Rfl:     DM-Doxylamine-Acetaminophen (NYQUIL COLD & FLU PO), Take  by mouth., Disp: , Rfl:     FLUTICASONE PROPIONATE NA, into the nostril(s) as directed by provider., Disp: , Rfl:     guaifenesin-dextromethorphan (MUCINEX DM)  MG tablet sustained-release 12 hour tablet, Take  by mouth 2 (Two) Times a Day As Needed., Disp: , Rfl:     Loratadine (KLS ALLERCLEAR PO), Take  by mouth., Disp: , Rfl:     MAGNESIUM PO, Take 2 tablets by mouth Every Night., Disp: , Rfl:     multivitamin with minerals tablet tablet, Take 1 tablet by mouth Daily., Disp: , Rfl:     Naproxen Sodium (ALEVE PO), Take  by mouth., Disp: , Rfl:     Vitamin D-Vitamin K (VITAMIN K2-VITAMIN D3 PO), Take  by mouth., Disp: , Rfl:     azithromycin (Zithromax) 250 MG tablet, Take 2 tablets the first day, then 1 tablet daily for 4 days., Disp: 6 tablet, Rfl: 0    cefdinir (OMNICEF) 300 MG capsule, Take 1 capsule by mouth 2 (Two) Times a Day for 10 days., Disp: 20 capsule, Rfl: 0  No Known Allergies  Social History     Tobacco Use    Smoking status: Never    Smokeless tobacco: Never   Vaping Use    Vaping Use: Never used   Substance Use Topics    Alcohol use: Yes     Comment: OCC    Drug use: Never          Objective   Physical Exam  Vitals:    01/11/24 1120   BP: 152/88   Pulse: 80   Temp: 97.7 °F (36.5 °C)   TempSrc: Infrared   SpO2: 98%     There is no height or weight on file to calculate BMI.    Constitutional: NAD.  Eyes: EOMI. PERRLA. Normal conjunctiva.  Ear, nose, mouth, throat: No tonsillar  exudates or erythema.   Normal nasal mucosa.  Bilateral serous middle ear effusions.  No tenderness to palpation of the sinuses.  Cardiovascular: RRR. No murmurs. No LE edema b/l. Radial pulses 2+ bilaterally.  Pulmonary: Crackles in the right lung base.  No expiratory wheezing.  Integumentary: No rashes or wounds on face or upper extremities.  Lymphatic: No anterior cervical lymphadenopathy.  Endocrine: No thyromegaly or palpable thyroid nodules.  Psychiatric: Normal affect. Normal thought content.     Assessment & Plan   Assessment and Plan  Pleasant 58-year-old male with cervical and lumbar stenosis with chronic pain and radiculopathy, GERD, Prinzmetal's angina, major depressive disorder with history of SI, who presents with the following:     Diagnoses and all orders for this visit:    1. Community acquired pneumonia of right lower lobe of lung (Primary): Will treat as community-acquired pneumonia, do not think he has a sinus infection today, symptoms should resolve but if not entirely improved need to update chest imaging and may consider antibiotic extension  -     azithromycin (Zithromax) 250 MG tablet; Take 2 tablets the first day, then 1 tablet daily for 4 days.  Dispense: 6 tablet; Refill: 0  -     cefdinir (OMNICEF) 300 MG capsule; Take 1 capsule by mouth 2 (Two) Times a Day for 10 days.  Dispense: 20 capsule; Refill: 0    2. Chronic shortness of breath: PFTs, chest x-ray, cardiac stress testing, echocardiogram have not showed any cause of shortness of breath.  I am going to update blood work to screen for anemia and other causes of shortness of breath.  If not improving we may try empiric Imdur due to history of Prinzmetal angina and get him back into cardiology for consideration of more invasive cardiac testing.  I am not convinced this is due to deconditioning.  -     CBC & Differential  -     Comprehensive Metabolic Panel  -     AMANDEEP  -     Sedimentation Rate  -     C-reactive Protein  -     Iron  Profile  -     Ferritin    3. Mixed hyperlipidemia  -     Comprehensive Metabolic Panel  -     Lipid Panel  -     Thyroid Panel With TSH    4. Elevated glucose  -     Hemoglobin A1c    5. Benign essential hypertension  -     Urinalysis With Microscopic - Urine, Clean Catch    Zac Henderson MD  [facility]  O: [phone]    Disclaimer: Parts of this note were dictated by speech recognition. Minor errors in transcription may be present. Please call if questions.

## 2024-01-12 LAB
ALBUMIN SERPL-MCNC: 4.3 G/DL (ref 3.8–4.9)
ALBUMIN/GLOB SERPL: 1.5 {RATIO} (ref 1.2–2.2)
ALP SERPL-CCNC: 102 IU/L (ref 44–121)
ALT SERPL-CCNC: 23 IU/L (ref 0–44)
ANA SER QL: NEGATIVE
AST SERPL-CCNC: 20 IU/L (ref 0–40)
BASOPHILS # BLD AUTO: 0 X10E3/UL (ref 0–0.2)
BASOPHILS NFR BLD AUTO: 0 %
BILIRUB SERPL-MCNC: 0.4 MG/DL (ref 0–1.2)
BUN SERPL-MCNC: 16 MG/DL (ref 6–24)
BUN/CREAT SERPL: 15 (ref 9–20)
CALCIUM SERPL-MCNC: 9.2 MG/DL (ref 8.7–10.2)
CHLORIDE SERPL-SCNC: 103 MMOL/L (ref 96–106)
CHOLEST SERPL-MCNC: 191 MG/DL (ref 100–199)
CO2 SERPL-SCNC: 24 MMOL/L (ref 20–29)
CREAT SERPL-MCNC: 1.1 MG/DL (ref 0.76–1.27)
CRP SERPL-MCNC: 3 MG/L (ref 0–10)
EGFRCR SERPLBLD CKD-EPI 2021: 78 ML/MIN/1.73
EOSINOPHIL # BLD AUTO: 0.1 X10E3/UL (ref 0–0.4)
EOSINOPHIL NFR BLD AUTO: 2 %
ERYTHROCYTE [DISTWIDTH] IN BLOOD BY AUTOMATED COUNT: 12.8 % (ref 11.6–15.4)
ERYTHROCYTE [SEDIMENTATION RATE] IN BLOOD BY WESTERGREN METHOD: 6 MM/HR (ref 0–30)
FERRITIN SERPL-MCNC: 233 NG/ML (ref 30–400)
FT4I SERPL CALC-MCNC: 3.2 (ref 1.2–4.9)
GLOBULIN SER CALC-MCNC: 2.9 G/DL (ref 1.5–4.5)
GLUCOSE SERPL-MCNC: 86 MG/DL (ref 70–99)
GLUCOSE UR QL STRIP: NORMAL
HBA1C MFR BLD: 5.4 % (ref 4.8–5.6)
HCT VFR BLD AUTO: 51 % (ref 37.5–51)
HDLC SERPL-MCNC: 39 MG/DL
HGB BLD-MCNC: 17.6 G/DL (ref 13–17.7)
IMM GRANULOCYTES # BLD AUTO: 0 X10E3/UL (ref 0–0.1)
IMM GRANULOCYTES NFR BLD AUTO: 0 %
IRON SATN MFR SERPL: 45 % (ref 15–55)
IRON SERPL-MCNC: 134 UG/DL (ref 38–169)
KETONES UR QL STRIP: NORMAL
LDLC SERPL CALC-MCNC: 129 MG/DL (ref 0–99)
LYMPHOCYTES # BLD AUTO: 1.6 X10E3/UL (ref 0.7–3.1)
LYMPHOCYTES NFR BLD AUTO: 30 %
MCH RBC QN AUTO: 31.4 PG (ref 26.6–33)
MCHC RBC AUTO-ENTMCNC: 34.5 G/DL (ref 31.5–35.7)
MCV RBC AUTO: 91 FL (ref 79–97)
MONOCYTES # BLD AUTO: 0.6 X10E3/UL (ref 0.1–0.9)
MONOCYTES NFR BLD AUTO: 10 %
NEUTROPHILS # BLD AUTO: 3.1 X10E3/UL (ref 1.4–7)
NEUTROPHILS NFR BLD AUTO: 58 %
PH UR STRIP: NORMAL [PH]
PLATELET # BLD AUTO: 184 X10E3/UL (ref 150–450)
POTASSIUM SERPL-SCNC: 4.8 MMOL/L (ref 3.5–5.2)
PROT SERPL-MCNC: 7.2 G/DL (ref 6–8.5)
PROT UR QL STRIP: NORMAL
RBC # BLD AUTO: 5.61 X10E6/UL (ref 4.14–5.8)
SODIUM SERPL-SCNC: 143 MMOL/L (ref 134–144)
SP GR UR STRIP: NORMAL
SPECIMEN STATUS: NORMAL
T3RU NFR SERPL: 30 % (ref 24–39)
T4 SERPL-MCNC: 10.6 UG/DL (ref 4.5–12)
TIBC SERPL-MCNC: 297 UG/DL (ref 250–450)
TRIGL SERPL-MCNC: 130 MG/DL (ref 0–149)
TSH SERPL DL<=0.005 MIU/L-ACNC: 1.08 UIU/ML (ref 0.45–4.5)
UIBC SERPL-MCNC: 163 UG/DL (ref 111–343)
VLDLC SERPL CALC-MCNC: 23 MG/DL (ref 5–40)
WBC # BLD AUTO: 5.4 X10E3/UL (ref 3.4–10.8)

## 2024-01-16 DIAGNOSIS — I20.1 PRINZMETAL'S ANGINA: ICD-10-CM

## 2024-01-16 DIAGNOSIS — R06.02 CHRONIC SHORTNESS OF BREATH: Primary | ICD-10-CM

## 2024-01-16 RX ORDER — ISOSORBIDE MONONITRATE 30 MG/1
30 TABLET, EXTENDED RELEASE ORAL DAILY
Qty: 30 TABLET | Refills: 1 | Status: SHIPPED | OUTPATIENT
Start: 2024-01-16

## 2024-01-23 ENCOUNTER — TELEPHONE (OUTPATIENT)
Dept: INTERNAL MEDICINE | Facility: CLINIC | Age: 59
End: 2024-01-23
Payer: COMMERCIAL

## 2024-01-23 NOTE — TELEPHONE ENCOUNTER
Caller: Manolo Stearns    Relationship to patient: Self    Best call back number: [phone]    Patient is needing: PATIENT STATES THAT HE WAS SEEN IN OFFICE ABOUT 10-12 DAYS AGO AND STILL HAS SOME OF THE STUFF GOING ON THAT HE WAS SEEN FOR. HE WOULD LIKE TO SEE ABOUT POSSIBLY HAVING SOME MORE MEDICATION CALLED IN FOR HIM AND IF HIS RESULTS ARE BACK YET. PLEASE REACH OUT AND ADVISE.

## 2024-01-24 DIAGNOSIS — J18.9 COMMUNITY ACQUIRED PNEUMONIA OF RIGHT LOWER LOBE OF LUNG: ICD-10-CM

## 2024-01-24 DIAGNOSIS — R06.02 CHRONIC SHORTNESS OF BREATH: Primary | ICD-10-CM

## 2024-01-24 RX ORDER — AZITHROMYCIN 250 MG/1
TABLET, FILM COATED ORAL
Qty: 6 TABLET | Refills: 0 | Status: SHIPPED | OUTPATIENT
Start: 2024-01-24

## 2024-01-24 RX ORDER — CEFDINIR 300 MG/1
300 CAPSULE ORAL 2 TIMES DAILY
Qty: 14 CAPSULE | Refills: 0 | Status: SHIPPED | OUTPATIENT
Start: 2024-01-24 | End: 2024-01-31

## 2024-01-24 NOTE — TELEPHONE ENCOUNTER
Please call patient  Will extend antibiotics 7 days with new rx sent  Needs to get CXR at [facility] preferably before he has been on antibiotics too long just so we can make sure not something else

## 2024-01-24 NOTE — TELEPHONE ENCOUNTER
Please call pt about his results and medication we called in for this problem, which he may have not read yet

## 2024-01-24 NOTE — TELEPHONE ENCOUNTER
Spoke to pt about labs, medications that were given and spoken about previously.     Pt understanding, will get medication.    But pt states that he is speaking in reference to when he was in office for pneumonia, still having issues with sinuses. Pt states that he finished last pill of abx on Sunday, but that he feels that he is not improving and doesn't want to back slide.     Pt states that he was advised if he doesn't improve after, to contact provider and that he could have more antibiotic.     Pt advised that message will be sent back.

## 2024-01-25 NOTE — TELEPHONE ENCOUNTER
Spoke to pt about  providers message, pt understanding and has no questions. Will attempt to get CXR as able.

## 2024-10-14 ENCOUNTER — OFFICE VISIT (OUTPATIENT)
Dept: INTERNAL MEDICINE | Facility: CLINIC | Age: 59
End: 2024-10-14
Payer: COMMERCIAL

## 2024-10-14 VITALS
OXYGEN SATURATION: 97 % | HEART RATE: 77 BPM | DIASTOLIC BLOOD PRESSURE: 84 MMHG | TEMPERATURE: 97.6 F | SYSTOLIC BLOOD PRESSURE: 136 MMHG

## 2024-10-14 DIAGNOSIS — J18.9 COMMUNITY ACQUIRED PNEUMONIA OF RIGHT LOWER LOBE OF LUNG: Primary | ICD-10-CM

## 2024-10-14 PROCEDURE — 99213 OFFICE O/P EST LOW 20 MIN: CPT | Performed by: NURSE PRACTITIONER

## 2024-10-14 RX ORDER — AZITHROMYCIN 250 MG/1
TABLET, FILM COATED ORAL
Qty: 6 TABLET | Refills: 0 | Status: SHIPPED | OUTPATIENT
Start: 2024-10-14

## 2024-10-14 RX ORDER — CEFDINIR 300 MG/1
300 CAPSULE ORAL 2 TIMES DAILY
Qty: 20 CAPSULE | Refills: 0 | Status: SHIPPED | OUTPATIENT
Start: 2024-10-14 | End: 2024-10-24

## 2024-10-14 NOTE — PROGRESS NOTES
"Chief Complaint  Sinus Problem, Cough, and Headache    Subjective        Manolo Stearns presents to [facility]  History of Present Illness  Here with about 1 month sinus pain and pressure, cough, headache.    He has been taking OTC Nyquil, no other medication. No fever, no fatigue, has long-COVID and has chronic SOA. He was treated for community-acquired pneumonia specifically of the right lower lobe of the lung January 11, 2024.  He did not get much response from the doxycycline.  Dr. Henderson changed antibiotic regimen to azithromycin (Z-Ashok) and cefdinir (Omnicef) 3 mg p.o. twice daily for 10 days.  He did get excellent relief with this combination.  He feels his symptoms are the same as when he was treated for pneumonia earlier in the year.    Is producing thick, dark with some blood-tinged material bilateral nares. No wheezing.       Objective   Vital Signs:  /84   Pulse 77   Temp 97.6 °F (36.4 °C)   SpO2 97%   Estimated body mass index is 31.74 kg/m² as calculated from the following:    Height as of 11/10/23: 175.3 cm (69.02\").    Weight as of 11/10/23: 97.5 kg (215 lb).               Physical Exam  Vitals and nursing note reviewed.   Constitutional:       General: He is not in acute distress.     Appearance: Normal appearance. He is not ill-appearing, toxic-appearing or diaphoretic.   HENT:      Right Ear: A middle ear effusion (mild, serous, cloudy) is present.      Left Ear: A middle ear effusion (mild, serous, cloudy) is present.      Nose: Congestion present.      Right Sinus: Maxillary sinus tenderness and frontal sinus tenderness present.      Left Sinus: Maxillary sinus tenderness and frontal sinus tenderness present.      Mouth/Throat:      Mouth: Mucous membranes are moist.      Pharynx: Oropharynx is clear.   Cardiovascular:      Rate and Rhythm: Normal rate and regular rhythm.      Heart sounds: Normal heart sounds.   Pulmonary:      Effort: Pulmonary " effort is normal.      Breath sounds: Examination of the right-lower field reveals decreased breath sounds. Examination of the left-lower field reveals decreased breath sounds. Decreased breath sounds (RLL > LLL) present.      Comments: Deep breath produces cough.   Skin:     General: Skin is warm and dry.   Neurological:      General: No focal deficit present.      Mental Status: He is alert and oriented to person, place, and time. Mental status is at baseline.   Psychiatric:         Mood and Affect: Mood normal.         Behavior: Behavior normal.         Thought Content: Thought content normal.         Judgment: Judgment normal.        Result Review :                   Assessment and Plan   Patient is a pleasant 58-year-old male with Prinzmetal's angina, benign essential hypertension, hyperlipidemia, obesity, major depressive disorder, primary osteoarthritis of both hips with other musculoskeletal issues here with upper respiratory symptoms most likely recurrent pneumonia on presentation.         Diagnoses and all orders for this visit:    1. Community acquired pneumonia of right lower lobe of lung (Primary)  -     azithromycin (Zithromax Z-Ashok) 250 MG tablet; Take 2 tablets by mouth on day 1, then 1 tablet daily on days 2-5  Dispense: 6 tablet; Refill: 0  -     cefdinir (OMNICEF) 300 MG capsule; Take 1 capsule by mouth 2 (Two) Times a Day for 10 days.  Dispense: 20 capsule; Refill: 0             Follow Up   Return for Follow up with Dr. Henderson as needed..  Patient was given instructions and counseling regarding his condition or for health maintenance advice. Please see specific information pulled into the AVS if appropriate.

## 2024-10-29 ENCOUNTER — OFFICE VISIT (OUTPATIENT)
Dept: INTERNAL MEDICINE | Facility: CLINIC | Age: 59
End: 2024-10-29
Payer: COMMERCIAL

## 2024-10-29 VITALS
DIASTOLIC BLOOD PRESSURE: 86 MMHG | OXYGEN SATURATION: 93 % | SYSTOLIC BLOOD PRESSURE: 140 MMHG | HEART RATE: 79 BPM | TEMPERATURE: 98.5 F

## 2024-10-29 DIAGNOSIS — J18.9 PERSISTENT PNEUMONIA: Primary | ICD-10-CM

## 2024-10-29 DIAGNOSIS — I63.81 LACUNAR STROKE: ICD-10-CM

## 2024-10-29 DIAGNOSIS — T50.B95A ADVERSE EFFECT OF COVID-19 VACCINE: ICD-10-CM

## 2024-10-29 PROBLEM — Z86.73 HISTORY OF MULTIPLE STROKES: Status: RESOLVED | Noted: 2024-10-29 | Resolved: 2024-10-29

## 2024-10-29 PROBLEM — Z86.73 HISTORY OF MULTIPLE STROKES: Status: ACTIVE | Noted: 2024-10-29

## 2024-10-29 PROCEDURE — 99214 OFFICE O/P EST MOD 30 MIN: CPT | Performed by: FAMILY MEDICINE

## 2024-10-29 RX ORDER — AZITHROMYCIN 250 MG/1
TABLET, FILM COATED ORAL
Qty: 6 TABLET | Refills: 0 | Status: SHIPPED | OUTPATIENT
Start: 2024-10-29

## 2024-10-29 RX ORDER — CEFDINIR 300 MG/1
300 CAPSULE ORAL 2 TIMES DAILY
Qty: 20 CAPSULE | Refills: 0 | Status: SHIPPED | OUTPATIENT
Start: 2024-10-29 | End: 2024-11-08

## 2024-10-29 NOTE — PROGRESS NOTES
"Date of Encounter: 10/29/2024  Patient: Manolo Stearns,  1965    Subjective   History of Presenting Illness  Chief complaint: Persistent pneumonia    Patient presented with clinically diagnosed pneumonia on 10/14/2024 and was treated with azithromycin and cefdinir.  He had had pneumonia in January where I treated him in with a similar regimen.  While his symptoms improved, he feels like he has plateaued and his symptoms are potentially recurring despite observation for the past 2 weeks.  He has a continued intermittently productive cough with thick white sputum, fatigue, sensation of shortness of air.  He felt similar after his treatment in January will reprovided him a prolonged prescription that finally resolved his illness.  He has not had any fever that he has measured.  He has tolerated these antibiotics reasonably well with some gastrointestinal side effects.  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  His PFTs were unremarkable and he has had PET/CT of the lung.    We did discuss some of his recent neurologic history.  He had strokelike symptoms shortly after his COVID-19 Pfizer vaccination in .  He had declined to take the vaccination but his employment in the  required him to take this and he waited until almost the last day before the deadline before adhering to this request.  His symptoms included leg weakness and sensory changes, and he has been seeing several specialists in the interim and ultimately underwent an MRI of the brain on 2023 which demonstrated a lacunar infarct in the right cerebellar hemisphere.  His neurologist at [place] did not feel that this lesion was concordant with his symptoms, and felt that \"I probably will not be able to provide a great explanation unfortunately.\"  They did recommend aspirin 81 mg which he is not taking. They did recommend a carotid " ultrasound which I do not have the results of.  He does have a hyperlipidemia but is very wary of healthcare due to his personal experience and does not want to take any medications  if not necessary and in general he declines statins.    The following portions of the patient's history were reviewed and updated as appropriate: allergies, current medications, past family history, past medical history, past social history, past surgical history and problem list.    Patient Active Problem List   Diagnosis    Lumbar radiculopathy    Prinzmetal's angina    Labral tear of shoulder, left, sequela    Lumbar stenosis, congenital and acquired    Chronic bilateral low back pain with bilateral sciatica    Hepatic cyst    Renal cyst    Benign essential hypertension    Cervical stenosis of spinal canal    Cervical radiculopathy    Major depressive disorder, recurrent episode, mild degree    Hyperlipidemia    Elevated hematocrit    Primary osteoarthritis of both hips    Cyclic citrullinated peptide (CCP) antibody positive, follow up testing negative    Subjective memory complaints    Lacunar stroke    Adverse effect of COVID-19 vaccine     History reviewed. No pertinent past medical history.  No past surgical history on file.  History reviewed. No pertinent family history.    Current Outpatient Medications:     albuterol sulfate  (90 Base) MCG/ACT inhaler, Inhale 2 puffs Every 4 (Four) Hours As Needed for Shortness of Air., Disp: 6.7 g, Rfl: 3    ascorbic acid (VITAMIN C) 500 MG tablet, , Disp: , Rfl:     Cholecalciferol (Vitamin D) 50 MCG (2000 UT) tablet, Take 1 tablet by mouth Daily., Disp: , Rfl:     DM-Doxylamine-Acetaminophen (NYQUIL COLD & FLU PO), Take  by mouth., Disp: , Rfl:     FLUTICASONE PROPIONATE NA, into the nostril(s) as directed by provider., Disp: , Rfl:     guaifenesin-dextromethorphan (MUCINEX DM)  MG tablet sustained-release 12 hour tablet, Take  by mouth 2 (Two) Times a Day As Needed., Disp: ,  Rfl:     isosorbide mononitrate (IMDUR) 30 MG 24 hr tablet, Take 1 tablet by mouth Daily., Disp: 30 tablet, Rfl: 1    Loratadine (KLS ALLERCLEAR PO), Take  by mouth., Disp: , Rfl:     MAGNESIUM PO, Take 2 tablets by mouth Every Night., Disp: , Rfl:     multivitamin with minerals tablet tablet, Take 1 tablet by mouth Daily., Disp: , Rfl:     Naproxen Sodium (ALEVE PO), Take  by mouth., Disp: , Rfl:     Vitamin D-Vitamin K (VITAMIN K2-VITAMIN D3 PO), Take  by mouth., Disp: , Rfl:     azithromycin (Zithromax) 250 MG tablet, Take 2 tablets the first day, then 1 tablet daily for 4 days., Disp: 6 tablet, Rfl: 0    cefdinir (OMNICEF) 300 MG capsule, Take 1 capsule by mouth 2 (Two) Times a Day for 10 days., Disp: 20 capsule, Rfl: 0  No Known Allergies  Social History     Tobacco Use    Smoking status: Never    Smokeless tobacco: Never   Vaping Use    Vaping status: Never Used   Substance Use Topics    Alcohol use: Yes     Comment: OCC    Drug use: Never          Objective   Physical Exam  Vitals:    10/29/24 1539   BP: 140/86   Pulse: 79   Temp: 98.5 °F (36.9 °C)   SpO2: 93%     There is no height or weight on file to calculate BMI.    Constitutional: NAD.  Eyes: EOMI. PERRLA. Normal conjunctiva.  Ear, nose, mouth, throat: No tonsillar exudates or erythema.   Normal nasal mucosa.  Bilateral cloudy ear effusions with mild tympanic membrane erythema left greater than right.  Cardiovascular: RRR. No murmurs. No LE edema b/l. Radial pulses 2+ bilaterally.  Pulmonary: There is coarseness and dullness to auscultation at the right costophrenic angle.  No wheezing.  Not able to appreciate any crackles.  The rest of the lungs are clear to auscultation.  Good effort.  Integumentary: No rashes or wounds on face or upper extremities.  Lymphatic: No anterior cervical lymphadenopathy.  Endocrine: No thyromegaly or palpable thyroid nodules.  Psychiatric: Normal affect. Normal thought content.     Assessment & Plan   Assessment and  Plan  58-year-old male with hypertension, hyperlipidemia, Prinzmetal's angina, major depressive disorder, chronic back pain, osteoarthritis of multiple joints, history of likely adverse reaction to COVID-19 vaccine (stroke), who presents for the following:    Diagnoses and all orders for this visit:    1. Persistent pneumonia (Primary): Per his previous experience in January we will provide him an extended course of antibiotics to resolve his persistent pneumonia.  He has had pulmonary function test which was unremarkable.  Chest x-ray unremarkable.  Echo normal.  Cardiac PET normal.  If he continues to get pneumonia we need to update imaging of his chest with high-resolution CT to look for any secondary cause.  If his symptoms do not improve I have asked him to update his chest x-ray which was already in the chart from January.  -     azithromycin (Zithromax) 250 MG tablet; Take 2 tablets the first day, then 1 tablet daily for 4 days.  Dispense: 6 tablet; Refill: 0  -     cefdinir (OMNICEF) 300 MG capsule; Take 1 capsule by mouth 2 (Two) Times a Day for 10 days.  Dispense: 20 capsule; Refill: 0    2. Adverse effect of COVID-19 vaccine  3. Lacunar stroke: He will never be able to prove that he had a lacunar infarct after his COVID-19 vaccination but to believe the patient his symptoms did start shortly after vaccination he was ultimately diagnosed with a lacunar stroke.  The delay in diagnosis does cloud the picture as he does have risk factors for lacunar stroke. I do think the benefits strongly outweigh the risks of starting aspirin enteric-coated 81 mg, he is hesitant but will consider.  The number needed to treat for secondary prevention is very low and favorable.  Secondary prevention with statin and hyperlipidemia argument can be made for a statin but this is not something he is willing to consider at this time which is understandable with his healthcare experience.  There are enough case reports of lacunar  strokes related to COVID-19 vaccinations where this is a possible connection.  I am not sure he could have made his decision any differently at the time.    He has a few of the things he wants to talk about and I would appreciate a physical to follow-up on other issues    Zac Henderson MD  Family Medicine  O: [phone]    Disclaimer: Parts of this note were dictated by speech recognition. Minor errors in transcription may be present. Please call if questions.

## 2025-01-17 ENCOUNTER — OFFICE VISIT (OUTPATIENT)
Dept: INTERNAL MEDICINE | Facility: CLINIC | Age: 60
End: 2025-01-17
Payer: COMMERCIAL

## 2025-01-17 VITALS
BODY MASS INDEX: 32.44 KG/M2 | DIASTOLIC BLOOD PRESSURE: 80 MMHG | SYSTOLIC BLOOD PRESSURE: 138 MMHG | OXYGEN SATURATION: 97 % | HEART RATE: 78 BPM | WEIGHT: 219 LBS | HEIGHT: 69 IN

## 2025-01-17 DIAGNOSIS — E78.2 MIXED HYPERLIPIDEMIA: ICD-10-CM

## 2025-01-17 DIAGNOSIS — H93.13 BILATERAL TINNITUS: ICD-10-CM

## 2025-01-17 DIAGNOSIS — M13.0 POLYARTHRITIS: ICD-10-CM

## 2025-01-17 DIAGNOSIS — T50.B95A ADVERSE EFFECT OF COVID-19 VACCINE: ICD-10-CM

## 2025-01-17 DIAGNOSIS — R39.11 URINARY HESITANCY: ICD-10-CM

## 2025-01-17 DIAGNOSIS — I20.1 PRINZMETAL'S ANGINA: ICD-10-CM

## 2025-01-17 DIAGNOSIS — J01.10 SUBACUTE FRONTAL SINUSITIS: ICD-10-CM

## 2025-01-17 DIAGNOSIS — Z12.5 SCREENING FOR MALIGNANT NEOPLASM OF PROSTATE: ICD-10-CM

## 2025-01-17 DIAGNOSIS — Z00.00 ANNUAL PHYSICAL EXAM: Primary | ICD-10-CM

## 2025-01-17 DIAGNOSIS — I10 BENIGN ESSENTIAL HYPERTENSION: ICD-10-CM

## 2025-01-17 DIAGNOSIS — R73.09 ELEVATED GLUCOSE: ICD-10-CM

## 2025-01-17 DIAGNOSIS — Z12.12 ENCOUNTER FOR SCREENING FOR COLORECTAL MALIGNANT NEOPLASM: ICD-10-CM

## 2025-01-17 DIAGNOSIS — M71.22 BAKER'S CYST OF KNEE, LEFT: ICD-10-CM

## 2025-01-17 DIAGNOSIS — Z12.11 ENCOUNTER FOR SCREENING FOR COLORECTAL MALIGNANT NEOPLASM: ICD-10-CM

## 2025-01-17 DIAGNOSIS — I63.81 LACUNAR STROKE: ICD-10-CM

## 2025-01-17 PROCEDURE — 99396 PREV VISIT EST AGE 40-64: CPT | Performed by: FAMILY MEDICINE

## 2025-01-17 RX ORDER — ASPIRIN 81 MG/1
81 TABLET ORAL DAILY
COMMUNITY

## 2025-01-17 RX ORDER — TAMSULOSIN HYDROCHLORIDE 0.4 MG/1
1 CAPSULE ORAL DAILY
Qty: 30 CAPSULE | Refills: 1 | Status: SHIPPED | OUTPATIENT
Start: 2025-01-17

## 2025-01-17 RX ORDER — PREDNISONE 10 MG/1
TABLET ORAL
Qty: 28 TABLET | Refills: 0 | Status: SHIPPED | OUTPATIENT
Start: 2025-01-17 | End: 2025-01-28

## 2025-01-17 NOTE — PROGRESS NOTES
Date of Encounter: 2025  Patient: Manolo Stearns,  1965    Subjective   History of Presenting Illness  Chief complaint: Annual physical    Sinus infection with increasing frontal sinus discomfort, ear discomfort. Worsening over the past 1.5 weeks.     Several weeks of left knee pain and lower leg swelling, right achilles tendonitis. Unsure if inciting incident. He does endorse recent sedentary lifestyle. No history of DVT. Left leg welling is actually improving.    History of hypertension, within acceptable ranges today.    Prinzmetal's angina 2018, briefly he underwent cardiac catheterization that was normal, CT PE was negative, Doppler was negative for DVT. Has not had any episodes of angina for over a year.     Bilateral tinnitus, has not had this evaluated before. Not causing major mood or lifestyle problems but something he is interested in addressing if able.    Polyarthritis including shoulder labral injuries, cervical and lumbar stenosis, history of negative CCP ab. Currently manages with activity modification, tylenol and nsaids as needed.    History of lacunar stroke, possibly related to covid19 vaccine. Takes ASA 81mg occasionally. Not interested in statin therapy in context of normal cardiac cath. Understandably vaccine hesitant.    GERD, tries to avoid dietary triggers.  He has to take Tums a few times a month for this.     Major depressive disorder with history of suicidal ideation, not currently interested medications due to concern for risk of suicidal ideation.  Has seen a therapist which is helpful.     Lives with wife, 2 children.  Never a smoker.  2 alcoholic drinks per week or less. Plans to start exercising regularly, but still working a lot.  Fairly healthy diet - he has lost weight successfully in the past.  Has never had colon cancer screening but willing to do cologuard.  Works as an  with the [employer].  Does not have a living will. Declines  vaccinations per above.    The following portions of the patient's history were reviewed and updated as appropriate: allergies, current medications, past family history, past medical history, past social history, past surgical history and problem list.    Patient Active Problem List   Diagnosis    Lumbar radiculopathy    Prinzmetal's angina    Labral tear of shoulder, left, sequela    Lumbar stenosis, congenital and acquired    Chronic bilateral low back pain with bilateral sciatica    Hepatic cyst    Renal cyst    Benign essential hypertension    Cervical stenosis of spinal canal    Cervical radiculopathy    Major depressive disorder, recurrent episode, mild degree    Hyperlipidemia    Elevated hematocrit    Primary osteoarthritis of both hips    Cyclic citrullinated peptide (CCP) antibody positive, follow up testing negative    Lacunar stroke    Adverse effect of COVID-19 vaccine     No past medical history on file.  No past surgical history on file.  No family history on file.    Current Outpatient Medications:     amoxicillin-clavulanate (AUGMENTIN) 875-125 MG per tablet, Take 1 tablet by mouth 2 (Two) Times a Day for 10 days., Disp: 20 tablet, Rfl: 1    ascorbic acid (VITAMIN C) 500 MG tablet, , Disp: , Rfl:     Cholecalciferol (Vitamin D) 50 MCG (2000 UT) tablet, Take 1 tablet by mouth Daily., Disp: , Rfl:     FLUTICASONE PROPIONATE NA, into the nostril(s) as directed by provider., Disp: , Rfl:     Loratadine (KLS ALLERCLEAR PO), Take  by mouth., Disp: , Rfl:     MAGNESIUM PO, Take 2 tablets by mouth Every Night., Disp: , Rfl:     multivitamin with minerals tablet tablet, Take 1 tablet by mouth Daily., Disp: , Rfl:     Naproxen Sodium (ALEVE PO), Take  by mouth., Disp: , Rfl:     Vitamin D-Vitamin K (VITAMIN K2-VITAMIN D3 PO), Take  by mouth., Disp: , Rfl:     aspirin 81 MG EC tablet, Take 1 tablet by mouth Daily., Disp: , Rfl:     predniSONE (DELTASONE) 10 MG tablet, Take 4 tablets by mouth Daily for 4 days,  "THEN 2 tablets Daily for 4 days, THEN 1 tablet Daily for 4 days., Disp: 28 tablet, Rfl: 0    tamsulosin (FLOMAX) 0.4 MG capsule 24 hr capsule, Take 1 capsule by mouth Daily., Disp: 30 capsule, Rfl: 1  No Known Allergies  Social History     Tobacco Use    Smoking status: Never    Smokeless tobacco: Never   Vaping Use    Vaping status: Never Used   Substance Use Topics    Alcohol use: Yes     Comment: OCC    Drug use: Never          Objective   Physical Exam  Vitals:    01/17/25 1535   BP: 138/80   Pulse: 78   SpO2: 97%   Weight: 99.3 kg (219 lb)   Height: 175.3 cm (69\")     Body mass index is 32.34 kg/m².    Constitutional: NAD.  Eyes: Normal conjunctiva.  Ear, nose, mouth, throat: Bilateral serious ear effusions with TM erythema. Frontal sinus tenderness to palpation. Nasopharynx obstructed by swollen inferior turbinates.  Cardiovascular: RRR. No murmurs. No LE edema b/l. Radial pulses 2+ bilaterally.  Pulmonary: CTA b/l. Good effort.  Integumentary: No rashes or wounds on face or upper extremities.  Lymphatic: No anterior cervical lymphadenopathy.  Endocrine: No thyromegaly or palpable thyroid nodules.  Psychiatric: Normal affect. Normal thought content.  Musculoskeletal: Tenderness to palpation of the right achilles tendon distally. Palpable and tender left baker's cyst, with some swelling distally in the left lower leg. Nontender calf. No pitting edema.  Gastrointestinal: Nondistended. No hepatosplenomegaly. No focal tenderness to palpation.      Assessment & Plan   Assessment and Plan  Pleasant 59 year old male with history of hypertension, HLD, h/o prinzmetal's angina with normal cath, polyarthritis including lumbar and cervical stenosis, shoulder arthritis, and others, lacunar stroke possibly COVID19 vaccine adverse event, who presents for the following    Diagnoses and all orders for this visit:    1. Annual physical exam (Primary):  We discussed preventative care including age and patient-appropriate " immunizations and cancer screening. We also discussed the importance of exercise and a healthy diet. This is their annual preventative exam.    2. Subacute frontal sinusitis  -     predniSONE (DELTASONE) 10 MG tablet; Take 4 tablets by mouth Daily for 4 days, THEN 2 tablets Daily for 4 days, THEN 1 tablet Daily for 4 days.  Dispense: 28 tablet; Refill: 0  -     amoxicillin-clavulanate (AUGMENTIN) 875-125 MG per tablet; Take 1 tablet by mouth 2 (Two) Times a Day for 10 days.  Dispense: 20 tablet; Refill: 1    3. Benign essential hypertension: Normotensive today  -     Urinalysis With Microscopic - Urine, Clean Catch    4. Prinzmetal's angina: No current symptoms. Has NTG.    5. Lacunar stroke: Recommend [id number]. Based on his prior lipids and stroke statin should be considered however he has a history of normal cath and stroke may have been vaccine related. Will discuss based on 10 year ascvd.    6. Adverse effect of COVID-19 vaccine: Based on his personal experience would not recommend any further covid19 vaccination. Will continue to discuss other vaccines as needed.    7. Baker's cyst of knee, left: Improving, low suspicion for DVT. Discussed reasons to consider further evaluation.    8. Mixed hyperlipidemia:  -     Comprehensive Metabolic Panel  -     CBC & Differential  -     Lipid Panel  -     Thyroid Panel With TSH  -     Testosterone    9. Polyarthritis: Detailed history per previous notes. Will address as symptoms worsen. Currently stable.    10. Bilateral tinnitus: Initial eval  -     Ambulatory Referral to Audiology    11. Urinary hesitancy: On ROS. PSA today. Empiric tamsulosin.  -     tamsulosin (FLOMAX) 0.4 MG capsule 24 hr capsule; Take 1 capsule by mouth Daily.  Dispense: 30 capsule; Refill: 1    12. Elevated glucose  -     Hemoglobin A1c    13. Screening for malignant neoplasm of prostate  -     PSA Screen    14. Encounter for screening for colorectal malignant neoplasm  -     Cologuard - Stool, Per  Rectum; Future    BMI is >= 30 and <35. (Class 1 Obesity). The following options were offered after discussion;: information on healthy weight added to patient's after visit summary     Return to office in 1 year for annual physical or earlier as needed.    Zac Henderson MD  Family Medicine  O: [phone]    Disclaimer: Parts of this note were dictated by speech recognition. Minor errors in transcription may be present. Please call if questions.

## 2025-01-18 PROBLEM — R41.89 SUBJECTIVE MEMORY COMPLAINTS: Status: RESOLVED | Noted: 2022-10-17 | Resolved: 2025-01-18

## 2025-01-18 LAB
ALBUMIN SERPL-MCNC: 4.2 G/DL (ref 3.8–4.9)
ALP SERPL-CCNC: 102 IU/L (ref 44–121)
ALT SERPL-CCNC: 18 IU/L (ref 0–44)
APPEARANCE UR: CLEAR
AST SERPL-CCNC: 19 IU/L (ref 0–40)
BACTERIA #/AREA URNS HPF: NORMAL /[HPF]
BASOPHILS # BLD AUTO: 0 X10E3/UL (ref 0–0.2)
BASOPHILS NFR BLD AUTO: 0 %
BILIRUB SERPL-MCNC: 0.5 MG/DL (ref 0–1.2)
BILIRUB UR QL STRIP: NEGATIVE
BUN SERPL-MCNC: 16 MG/DL (ref 6–24)
BUN/CREAT SERPL: 14 (ref 9–20)
CALCIUM SERPL-MCNC: 9.1 MG/DL (ref 8.7–10.2)
CASTS URNS QL MICRO: NORMAL /LPF
CHLORIDE SERPL-SCNC: 103 MMOL/L (ref 96–106)
CHOLEST SERPL-MCNC: 214 MG/DL (ref 100–199)
CO2 SERPL-SCNC: 26 MMOL/L (ref 20–29)
COLOR UR: YELLOW
CREAT SERPL-MCNC: 1.18 MG/DL (ref 0.76–1.27)
EGFRCR SERPLBLD CKD-EPI 2021: 71 ML/MIN/1.73
EOSINOPHIL # BLD AUTO: 0.2 X10E3/UL (ref 0–0.4)
EOSINOPHIL NFR BLD AUTO: 2 %
EPI CELLS #/AREA URNS HPF: NORMAL /HPF (ref 0–10)
ERYTHROCYTE [DISTWIDTH] IN BLOOD BY AUTOMATED COUNT: 13 % (ref 11.6–15.4)
FT4I SERPL CALC-MCNC: 2.7 (ref 1.2–4.9)
GLOBULIN SER CALC-MCNC: 2.9 G/DL (ref 1.5–4.5)
GLUCOSE SERPL-MCNC: 85 MG/DL (ref 70–99)
GLUCOSE UR QL STRIP: NEGATIVE
HBA1C MFR BLD: 5.4 % (ref 4.8–5.6)
HCT VFR BLD AUTO: 52.4 % (ref 37.5–51)
HDLC SERPL-MCNC: 39 MG/DL
HGB BLD-MCNC: 17.7 G/DL (ref 13–17.7)
HGB UR QL STRIP: NEGATIVE
IMM GRANULOCYTES # BLD AUTO: 0 X10E3/UL (ref 0–0.1)
IMM GRANULOCYTES NFR BLD AUTO: 0 %
KETONES UR QL STRIP: NEGATIVE
LDLC SERPL CALC-MCNC: 137 MG/DL (ref 0–99)
LEUKOCYTE ESTERASE UR QL STRIP: NEGATIVE
LYMPHOCYTES # BLD AUTO: 1.8 X10E3/UL (ref 0.7–3.1)
LYMPHOCYTES NFR BLD AUTO: 28 %
MCH RBC QN AUTO: 31.4 PG (ref 26.6–33)
MCHC RBC AUTO-ENTMCNC: 33.8 G/DL (ref 31.5–35.7)
MCV RBC AUTO: 93 FL (ref 79–97)
MICRO URNS: ABNORMAL
MONOCYTES # BLD AUTO: 0.6 X10E3/UL (ref 0.1–0.9)
MONOCYTES NFR BLD AUTO: 10 %
NEUTROPHILS # BLD AUTO: 3.8 X10E3/UL (ref 1.4–7)
NEUTROPHILS NFR BLD AUTO: 60 %
NITRITE UR QL STRIP: NEGATIVE
PH UR STRIP: 5.5 [PH] (ref 5–7.5)
PLATELET # BLD AUTO: 197 X10E3/UL (ref 150–450)
POTASSIUM SERPL-SCNC: 4.1 MMOL/L (ref 3.5–5.2)
PROT SERPL-MCNC: 7.1 G/DL (ref 6–8.5)
PROT UR QL STRIP: ABNORMAL
PSA SERPL-MCNC: 0.5 NG/ML (ref 0–4)
RBC # BLD AUTO: 5.64 X10E6/UL (ref 4.14–5.8)
RBC #/AREA URNS HPF: NORMAL /HPF (ref 0–2)
SODIUM SERPL-SCNC: 144 MMOL/L (ref 134–144)
SP GR UR STRIP: 1.02 (ref 1–1.03)
T3RU NFR SERPL: 28 % (ref 24–39)
T4 SERPL-MCNC: 9.8 UG/DL (ref 4.5–12)
TESTOST SERPL-MCNC: 316 NG/DL (ref 264–916)
TRIGL SERPL-MCNC: 210 MG/DL (ref 0–149)
TSH SERPL DL<=0.005 MIU/L-ACNC: 2.16 UIU/ML (ref 0.45–4.5)
UROBILINOGEN UR STRIP-MCNC: 0.2 MG/DL (ref 0.2–1)
VLDLC SERPL CALC-MCNC: 38 MG/DL (ref 5–40)
WBC # BLD AUTO: 6.4 X10E3/UL (ref 3.4–10.8)
WBC #/AREA URNS HPF: NORMAL /HPF (ref 0–5)

## 2025-01-22 DIAGNOSIS — R80.9 PROTEINURIA, UNSPECIFIED TYPE: Primary | ICD-10-CM

## 2025-01-22 NOTE — PROGRESS NOTES
1. Your cholesterol remains elevated. However, you had a normal coronary catheterization in 2018, which means that this was not causing any plaques in the heart at that time.    It has been about 7 years, and with this cholesterol I think you do have ongoing risk of stroke and heart disease in the future, but I would prefer to further risk stratify you before really pushing any statin medication.    In cases like these sometimes we find it beneficial to do something called a coronary artery calcium scan.  This is a quick, noninvasive CT scan that looks for calcium around the arteries in your heart.  Large amounts of calcium may suggest underlying heart disease, and in this case a statin could be more strongly recommended as it would be more beneficial.  If you did not have large amounts of calcium then we can hold on the statin medication. This test is also affordable and often covered by insurance.    I recommend considering a coronary artery calcium scan.  If you would like to proceed with this please message or call my office and let us know and we will arrange it.    2. Hematocrit continues to be borderline elevated which is probably from mild sleep apnea. If you are having symptoms like chronic fatigue, and willing to consider using a CPAP if indicated, then I would recommend you consider a sleep study at home which I can arrange a consultation for.    3. Trace protein in the urine - this is a screening test, not a diagnostic test. Since you had trace protein in your urine about 2 years ago as well I want to get a diagnostic test called a protein creatinine ratio to make sure you are not having any kidney problems which can sometimes manifest like this. All you have to do is call for a lab only appt and be ready to leave a urine sample and we will run this more specific test. It will probably be normal but it is prudent to do in my opinion.    No other concerns on my end    Let me know about your preference for  the three above items and my recommendations

## 2025-01-31 ENCOUNTER — OFFICE VISIT (OUTPATIENT)
Dept: INTERNAL MEDICINE | Facility: CLINIC | Age: 60
End: 2025-01-31
Payer: COMMERCIAL

## 2025-01-31 VITALS
TEMPERATURE: 97.5 F | DIASTOLIC BLOOD PRESSURE: 82 MMHG | WEIGHT: 220 LBS | HEIGHT: 69 IN | SYSTOLIC BLOOD PRESSURE: 136 MMHG | HEART RATE: 96 BPM | OXYGEN SATURATION: 97 % | BODY MASS INDEX: 32.58 KG/M2

## 2025-01-31 DIAGNOSIS — J01.10 SUBACUTE FRONTAL SINUSITIS: Primary | ICD-10-CM

## 2025-01-31 PROCEDURE — 99213 OFFICE O/P EST LOW 20 MIN: CPT | Performed by: NURSE PRACTITIONER

## 2025-01-31 RX ORDER — DOXYCYCLINE 100 MG/1
100 CAPSULE ORAL 2 TIMES DAILY
Qty: 20 CAPSULE | Refills: 0 | Status: SHIPPED | OUTPATIENT
Start: 2025-01-31 | End: 2025-02-10

## 2025-01-31 NOTE — PROGRESS NOTES
"Chief Complaint  Cough, Headache, and Facial Pain    Subjective        Manolo Stearns presents to [facility]  History of Present Illness  He was last seen in our office on January 17, 2025 for routine physical exam.  At the time, he had a sinus infection mostly in the frontal sinus area that was worsening over 1.5 weeks.  You are prescribed prednisone 10 mg to take 4 tablets by mouth daily for 4 days and then taper her dose down.  The patient was also prescribed Augmentin 1 pill twice a day for 10 days.    He did not get any relief. Has not seen an ENT in the past. He is now coughing again.       Objective   Vital Signs:  /82   Pulse 96   Temp 97.5 °F (36.4 °C)   Ht 175.3 cm (69\")   Wt 99.8 kg (220 lb)   SpO2 97%   BMI 32.49 kg/m²   Estimated body mass index is 32.49 kg/m² as calculated from the following:    Height as of this encounter: 175.3 cm (69\").    Weight as of this encounter: 99.8 kg (220 lb).               Physical Exam  Vitals and nursing note reviewed.   Constitutional:       General: He is not in acute distress.     Appearance: Normal appearance. He is not ill-appearing, toxic-appearing or diaphoretic.      Comments: Patient is wearing a mask   HENT:      Nose:      Right Sinus: Frontal sinus tenderness present. No maxillary sinus tenderness.      Left Sinus: Frontal sinus tenderness present. No maxillary sinus tenderness.   Cardiovascular:      Rate and Rhythm: Normal rate and regular rhythm.      Heart sounds: Normal heart sounds.   Pulmonary:      Effort: Pulmonary effort is normal.      Breath sounds: Normal breath sounds.   Neurological:      General: No focal deficit present.      Mental Status: He is alert and oriented to person, place, and time. Mental status is at baseline.        Result Review :                   Assessment and Plan   Patient is a pleasant 59-year-old male with Prinzmetal's angina, benign essential hypertension, hyperlipidemia, " obesity, major depressive disorder, primary osteoarthritis of both hips with other musculoskeletal issues here with recurrent frontal sinusitis.           Diagnoses and all orders for this visit:    1. Subacute frontal sinusitis (Primary)  -     doxycycline (VIBRAMYCIN) 100 MG capsule; Take 1 capsule by mouth 2 (Two) Times a Day for 10 days.  Dispense: 20 capsule; Refill: 0    Take OTC medication to help with symptom relief.          Follow Up   Return for Follow up with Dr. Henderson as needed..  Patient was given instructions and counseling regarding his condition or for health maintenance advice. Please see specific information pulled into the AVS if appropriate.

## 2025-02-14 ENCOUNTER — OFFICE VISIT (OUTPATIENT)
Dept: INTERNAL MEDICINE | Facility: CLINIC | Age: 60
End: 2025-02-14
Payer: COMMERCIAL

## 2025-02-14 VITALS
BODY MASS INDEX: 31.93 KG/M2 | OXYGEN SATURATION: 97 % | TEMPERATURE: 97.1 F | HEIGHT: 69 IN | DIASTOLIC BLOOD PRESSURE: 76 MMHG | SYSTOLIC BLOOD PRESSURE: 134 MMHG | HEART RATE: 83 BPM | WEIGHT: 215.6 LBS

## 2025-02-14 DIAGNOSIS — E78.5 HYPERLIPIDEMIA, UNSPECIFIED HYPERLIPIDEMIA TYPE: ICD-10-CM

## 2025-02-14 DIAGNOSIS — J32.4 CHRONIC PANSINUSITIS: Primary | ICD-10-CM

## 2025-02-14 PROCEDURE — 99213 OFFICE O/P EST LOW 20 MIN: CPT | Performed by: FAMILY MEDICINE

## 2025-02-14 RX ORDER — OXYMETAZOLINE HYDROCHLORIDE 0.05 G/100ML
2 SPRAY NASAL 2 TIMES DAILY
Qty: 15 ML | Refills: 1 | Status: SHIPPED | OUTPATIENT
Start: 2025-02-14

## 2025-02-14 RX ORDER — CEFDINIR 300 MG/1
300 CAPSULE ORAL 2 TIMES DAILY
Qty: 56 CAPSULE | Refills: 0 | Status: SHIPPED | OUTPATIENT
Start: 2025-02-14 | End: 2025-03-14

## 2025-02-14 NOTE — PROGRESS NOTES
Date of Encounter: 2025  Patient: Manolo Stearns,  1965    Subjective   History of Presenting Illness  Chief complaint: Sinusitis    Presents with chronic sinusitis.  He did have marginal improvement after completion of Augmentin, prednisone did not help at all with his sinuses but did help his joints feel better.  He then saw Kamla with recurrent symptoms and green discharge with facial fullness and was given doxycycline which helped as well until it ran out.  He presents facial discomfort, green nasal discharge, postnasal drip, fatigue. he has never seen ENT or allergy.  He has a distant history of sinus surgery when younger.  He is using an over-the-counter antihistamine.  May be some exposure to water damage at work but nothing at home.  His symptoms started when he moved to [place].    The following portions of the patient's history were reviewed and updated as appropriate: allergies, current medications, past family history, past medical history, past social history, past surgical history and problem list.    Patient Active Problem List   Diagnosis    Lumbar radiculopathy    Prinzmetal's angina    Labral tear of shoulder, left, sequela    Lumbar stenosis, congenital and acquired    Chronic bilateral low back pain with bilateral sciatica    Hepatic cyst    Renal cyst    Benign essential hypertension    Cervical stenosis of spinal canal    Cervical radiculopathy    Major depressive disorder, recurrent episode, mild degree    Hyperlipidemia    Elevated hematocrit    Primary osteoarthritis of both hips    Cyclic citrullinated peptide (CCP) antibody positive, follow up testing negative    Lacunar stroke    Adverse effect of COVID-19 vaccine     History reviewed. No pertinent past medical history.  No past surgical history on file.  History reviewed. No pertinent family history.    Current Outpatient Medications:     ascorbic acid (VITAMIN C) 500 MG tablet, , Disp: , Rfl:     aspirin 81 MG EC  "tablet, Take 1 tablet by mouth Daily., Disp: , Rfl:     Cholecalciferol (Vitamin D) 50 MCG (2000 UT) tablet, Take 1 tablet by mouth Daily., Disp: , Rfl:     FLUTICASONE PROPIONATE NA, into the nostril(s) as directed by provider., Disp: , Rfl:     Loratadine (KLS ALLERCLEAR PO), Take  by mouth., Disp: , Rfl:     MAGNESIUM PO, Take 2 tablets by mouth Every Night., Disp: , Rfl:     multivitamin with minerals tablet tablet, Take 1 tablet by mouth Daily., Disp: , Rfl:     Naproxen Sodium (ALEVE PO), Take  by mouth., Disp: , Rfl:     tamsulosin (FLOMAX) 0.4 MG capsule 24 hr capsule, Take 1 capsule by mouth Daily., Disp: 30 capsule, Rfl: 1    Vitamin D-Vitamin K (VITAMIN K2-VITAMIN D3 PO), Take  by mouth., Disp: , Rfl:     cefdinir (OMNICEF) 300 MG capsule, Take 1 capsule by mouth 2 (Two) Times a Day for 28 days., Disp: 56 capsule, Rfl: 0    oxymetazoline (Afrin Nasal Spray) 0.05 % nasal spray, Administer 2 sprays into the nostril(s) as directed by provider 2 (Two) Times a Day. Do not take for more than 3 days in a row., Disp: 15 mL, Rfl: 1  No Known Allergies  Social History     Tobacco Use    Smoking status: Never    Smokeless tobacco: Never   Vaping Use    Vaping status: Never Used   Substance Use Topics    Alcohol use: Yes     Comment: OCC    Drug use: Never          Objective   Physical Exam  Vitals:    02/14/25 0826   BP: 134/76   BP Location: Left arm   Patient Position: Sitting   Cuff Size: Large Adult   Pulse: 83   Temp: 97.1 °F (36.2 °C)   TempSrc: Infrared   SpO2: 97%   Weight: 97.8 kg (215 lb 9.6 oz)   Height: 175.3 cm (69\")     Body mass index is 31.84 kg/m².    Constitutional: NAD.  Eyes: EOMI. PERRLA.   Ear, nose, mouth, throat: No tonsillar exudates or erythema.  Incredibly swollen inferior turbinates with nasopharyngeal with near obstruction bilaterally.  Discomfort of both maxillary and frontal sinuses to palpation.  Bilateral serous ear effusions left greater than right.   Cardiovascular: RRR. No " murmurs. No LE edema b/l. Radial pulses 2+ bilaterally.  Pulmonary: CTA b/l. Good effort.  No wheezing or coarseness with coughing   Integumentary: No rashes or wounds on face or upper extremities.  Lymphatic: Tender jugulodigastric nodes  Endocrine: No thyromegaly or palpable thyroid nodules.  Psychiatric: Normal affect. Normal thought content.     Assessment & Plan   Assessment and Plan  Pleasant 59 year old male with history of hypertension, HLD, h/o prinzmetal's angina with normal cath, polyarthritis including lumbar and cervical stenosis, shoulder arthritis, and others, lacunar stroke possibly COVID19 vaccine adverse event, who presents for the following     Diagnoses and all orders for this visit:    1. Chronic pansinusitis (Primary): Prolonged antibiotic course due to chronic sinusitis.  Since prednisone did not help with his sinus swelling we will try Afrin in short bursts. We will also get him to ENT and allergy for further evaluation and arrange a CT of the sinuses.  -     oxymetazoline (Afrin Nasal Spray) 0.05 % nasal spray; Administer 2 sprays into the nostril(s) as directed by provider 2 (Two) Times a Day. Do not take for more than 3 days in a row.  Dispense: 15 mL; Refill: 1  -     CT Sinus Without Contrast; Future  -     Ambulatory Referral to ENT (Otolaryngology)    2. Hyperlipidemia, unspecified hyperlipidemia type  -     CT Cardiac Calcium Score Without Dye; Future    Zac Henderson MD  Family Medicine  O: [phone]    Disclaimer: Parts of this note were dictated by speech recognition. Minor errors in transcription may be present. Please call if questions.

## 2025-02-20 ENCOUNTER — TELEPHONE (OUTPATIENT)
Dept: INTERNAL MEDICINE | Facility: CLINIC | Age: 60
End: 2025-02-20

## 2025-02-20 NOTE — TELEPHONE ENCOUNTER
Caller: Manolo Stearns    Relationship to patient: Self    Best call back number: [phone]     Patient is needing:   [facility] CALLED PATIENT TO RELAY THAT CT SCAN HAS BEEN DENIED AND APPT CANCELLED.    PATIENT IS REQUESTING THE PROVIDER  EVALUATE THE DENIAL AND CALL BACK TO DISCUSS OR COMMUNICATE ALTERNATIVES.

## 2025-02-26 NOTE — TELEPHONE ENCOUNTER
I called the patient and left a voicemail. If he is wanting the CTS done at [facility] it is going to cost $325 each for a CT without contrast. I told him that if he is still wanting to have them done to give us a call and we can refax the orders.     Hub to relay

## 2025-03-10 ENCOUNTER — RESULTS FOLLOW-UP (OUTPATIENT)
Dept: INTERNAL MEDICINE | Facility: CLINIC | Age: 60
End: 2025-03-10
Payer: COMMERCIAL